# Patient Record
Sex: MALE | Race: WHITE | Employment: OTHER | URBAN - METROPOLITAN AREA
[De-identification: names, ages, dates, MRNs, and addresses within clinical notes are randomized per-mention and may not be internally consistent; named-entity substitution may affect disease eponyms.]

---

## 2022-05-31 ENCOUNTER — HOSPITAL ENCOUNTER (OUTPATIENT)
Dept: PREADMISSION TESTING | Age: 82
Discharge: HOME | End: 2022-05-31

## 2022-05-31 VITALS — HEIGHT: 69 IN | BODY MASS INDEX: 19.99 KG/M2 | WEIGHT: 135 LBS

## 2022-05-31 RX ORDER — SODIUM CHLORIDE 9 MG/ML
500 INJECTION, SOLUTION INTRAVENOUS CONTINUOUS
Status: CANCELLED | OUTPATIENT
Start: 2022-05-31

## 2022-05-31 NOTE — PERIOP NOTES
Patient advised to wear mask when entering any of the buildings. They will no longer need to be tested or quarantine prior to procedure. Patient does not meet criteria for special pop. No hx of sleep apnea or previous screening. Denies hx of MH. PCP is aware of surgery. CHG skin care kit given and process reviewed. Not participating in any research study or clinical trials. Patient instructed when coming in for surgery, do not use any lotions, creams or deodorant. Also to remove all jewelry. Instructed to wear something loose fitting and comfortable, easy to take off, easy to put on. Coming in 6-1 for labs/ekg.

## 2022-06-01 ENCOUNTER — HOSPITAL ENCOUNTER (OUTPATIENT)
Dept: PREADMISSION TESTING | Age: 82
Discharge: HOME | End: 2022-06-01
Payer: MEDICARE

## 2022-06-01 LAB
ANION GAP SERPL CALC-SCNC: 2 MMOL/L (ref 3–18)
ATRIAL RATE: 71 BPM
BUN SERPL-MCNC: 17 MG/DL (ref 7–18)
BUN/CREAT SERPL: 16 (ref 12–20)
CALCIUM SERPL-MCNC: 9 MG/DL (ref 8.5–10.1)
CALCULATED P AXIS, ECG09: 68 DEGREES
CALCULATED R AXIS, ECG10: 13 DEGREES
CALCULATED T AXIS, ECG11: 89 DEGREES
CHLORIDE SERPL-SCNC: 107 MMOL/L (ref 100–111)
CO2 SERPL-SCNC: 32 MMOL/L (ref 21–32)
CREAT SERPL-MCNC: 1.08 MG/DL (ref 0.6–1.3)
DIAGNOSIS, 93000: NORMAL
ERYTHROCYTE [DISTWIDTH] IN BLOOD BY AUTOMATED COUNT: 14.1 % (ref 11.6–14.5)
GLUCOSE SERPL-MCNC: 93 MG/DL (ref 74–99)
HCT VFR BLD AUTO: 47.4 % (ref 36–48)
HGB BLD-MCNC: 15.3 G/DL (ref 13–16)
INR PPP: 0.9 (ref 0.8–1.2)
MCH RBC QN AUTO: 29.9 PG (ref 24–34)
MCHC RBC AUTO-ENTMCNC: 32.3 G/DL (ref 31–37)
MCV RBC AUTO: 92.6 FL (ref 78–100)
NRBC # BLD: 0 K/UL (ref 0–0.01)
NRBC BLD-RTO: 0 PER 100 WBC
P-R INTERVAL, ECG05: 182 MS
PLATELET # BLD AUTO: 196 K/UL (ref 135–420)
PMV BLD AUTO: 9.2 FL (ref 9.2–11.8)
POTASSIUM SERPL-SCNC: 4 MMOL/L (ref 3.5–5.5)
PROTHROMBIN TIME: 12.9 SEC (ref 11.5–15.2)
Q-T INTERVAL, ECG07: 404 MS
QRS DURATION, ECG06: 112 MS
QTC CALCULATION (BEZET), ECG08: 439 MS
RBC # BLD AUTO: 5.12 M/UL (ref 4.35–5.65)
SODIUM SERPL-SCNC: 141 MMOL/L (ref 136–145)
VENTRICULAR RATE, ECG03: 71 BPM
WBC # BLD AUTO: 5.4 K/UL (ref 4.6–13.2)

## 2022-06-01 PROCEDURE — 85610 PROTHROMBIN TIME: CPT

## 2022-06-01 PROCEDURE — 85027 COMPLETE CBC AUTOMATED: CPT

## 2022-06-01 PROCEDURE — 80048 BASIC METABOLIC PNL TOTAL CA: CPT

## 2022-06-01 PROCEDURE — 36415 COLL VENOUS BLD VENIPUNCTURE: CPT

## 2022-06-01 PROCEDURE — 93005 ELECTROCARDIOGRAM TRACING: CPT

## 2022-06-09 ENCOUNTER — APPOINTMENT (OUTPATIENT)
Dept: GENERAL RADIOLOGY | Age: 82
End: 2022-06-09
Attending: INTERNAL MEDICINE
Payer: MEDICARE

## 2022-06-09 ENCOUNTER — HOSPITAL ENCOUNTER (OUTPATIENT)
Age: 82
Setting detail: OUTPATIENT SURGERY
Discharge: HOME | End: 2022-06-09
Attending: INTERNAL MEDICINE | Admitting: INTERNAL MEDICINE
Payer: MEDICARE

## 2022-06-09 ENCOUNTER — ANESTHESIA EVENT (OUTPATIENT)
Dept: ENDOSCOPY | Age: 82
End: 2022-06-09
Payer: MEDICARE

## 2022-06-09 ENCOUNTER — ANESTHESIA (OUTPATIENT)
Dept: ENDOSCOPY | Age: 82
End: 2022-06-09
Payer: MEDICARE

## 2022-06-09 VITALS
OXYGEN SATURATION: 97 % | SYSTOLIC BLOOD PRESSURE: 112 MMHG | HEIGHT: 67 IN | DIASTOLIC BLOOD PRESSURE: 54 MMHG | HEART RATE: 68 BPM | RESPIRATION RATE: 20 BRPM | BODY MASS INDEX: 20.89 KG/M2 | TEMPERATURE: 98.3 F | WEIGHT: 133.1 LBS

## 2022-06-09 PROBLEM — Z85.118 HISTORY OF LUNG CANCER: Status: ACTIVE | Noted: 2022-06-09

## 2022-06-09 PROBLEM — R59.1 LYMPHADENOPATHY: Status: ACTIVE | Noted: 2022-06-09

## 2022-06-09 PROBLEM — R91.1 LUNG NODULE: Status: ACTIVE | Noted: 2022-06-09

## 2022-06-09 LAB
ERYTHROCYTE [DISTWIDTH] IN BLOOD BY AUTOMATED COUNT: 14.2 % (ref 11.6–14.5)
HCT VFR BLD AUTO: 44.1 % (ref 36–48)
HGB BLD-MCNC: 14.3 G/DL (ref 13–16)
INR PPP: 1 (ref 0.8–1.2)
MCH RBC QN AUTO: 29.1 PG (ref 24–34)
MCHC RBC AUTO-ENTMCNC: 32.4 G/DL (ref 31–37)
MCV RBC AUTO: 89.6 FL (ref 78–100)
NRBC # BLD: 0 K/UL (ref 0–0.01)
NRBC BLD-RTO: 0 PER 100 WBC
PLATELET # BLD AUTO: 169 K/UL (ref 135–420)
PMV BLD AUTO: 9.5 FL (ref 9.2–11.8)
PROTHROMBIN TIME: 13.3 SEC (ref 11.5–15.2)
RBC # BLD AUTO: 4.92 M/UL (ref 4.35–5.65)
WBC # BLD AUTO: 5.7 K/UL (ref 4.6–13.2)

## 2022-06-09 PROCEDURE — 77030022556 HC FCPS BIOP TIS ENDOSC BSC -B: Performed by: INTERNAL MEDICINE

## 2022-06-09 PROCEDURE — 74011000250 HC RX REV CODE- 250: Performed by: NURSE ANESTHETIST, CERTIFIED REGISTERED

## 2022-06-09 PROCEDURE — 77030003400 HC NDL ASPIR BIOP CNMD -B: Performed by: INTERNAL MEDICINE

## 2022-06-09 PROCEDURE — 88112 CYTOPATH CELL ENHANCE TECH: CPT

## 2022-06-09 PROCEDURE — 74011250636 HC RX REV CODE- 250/636: Performed by: INTERNAL MEDICINE

## 2022-06-09 PROCEDURE — 85027 COMPLETE CBC AUTOMATED: CPT

## 2022-06-09 PROCEDURE — 85610 PROTHROMBIN TIME: CPT

## 2022-06-09 PROCEDURE — 88172 CYTP DX EVAL FNA 1ST EA SITE: CPT

## 2022-06-09 PROCEDURE — 77030018823 HC SLV COMPR VENO -B: Performed by: INTERNAL MEDICINE

## 2022-06-09 PROCEDURE — 77030003454 HC NDL BIOP BRONCH BSC -B: Performed by: INTERNAL MEDICINE

## 2022-06-09 PROCEDURE — 76040000008: Performed by: INTERNAL MEDICINE

## 2022-06-09 PROCEDURE — 76060000033 HC ANESTHESIA 1 TO 1.5 HR: Performed by: INTERNAL MEDICINE

## 2022-06-09 PROCEDURE — 88177 CYTP FNA EVAL EA ADDL: CPT

## 2022-06-09 PROCEDURE — 88305 TISSUE EXAM BY PATHOLOGIST: CPT

## 2022-06-09 PROCEDURE — 87205 SMEAR GRAM STAIN: CPT

## 2022-06-09 PROCEDURE — 74011000250 HC RX REV CODE- 250: Performed by: INTERNAL MEDICINE

## 2022-06-09 PROCEDURE — 77030022853 HC NDL ASPIR ULTRSND BSC -C: Performed by: INTERNAL MEDICINE

## 2022-06-09 PROCEDURE — 88173 CYTOPATH EVAL FNA REPORT: CPT

## 2022-06-09 PROCEDURE — 87102 FUNGUS ISOLATION CULTURE: CPT

## 2022-06-09 PROCEDURE — 77030012699 HC VLV SUC CNTRL OCOA -A: Performed by: INTERNAL MEDICINE

## 2022-06-09 PROCEDURE — 36415 COLL VENOUS BLD VENIPUNCTURE: CPT

## 2022-06-09 PROCEDURE — 77030040361 HC SLV COMPR DVT MDII -B: Performed by: INTERNAL MEDICINE

## 2022-06-09 PROCEDURE — 74011250636 HC RX REV CODE- 250/636: Performed by: NURSE ANESTHETIST, CERTIFIED REGISTERED

## 2022-06-09 PROCEDURE — 71045 X-RAY EXAM CHEST 1 VIEW: CPT

## 2022-06-09 PROCEDURE — 77030008683 HC TU ET CUF COVD -A: Performed by: SPECIALIST

## 2022-06-09 PROCEDURE — 77030008477 HC STYL SATN SLP COVD -A: Performed by: SPECIALIST

## 2022-06-09 PROCEDURE — 87206 SMEAR FLUORESCENT/ACID STAI: CPT

## 2022-06-09 PROCEDURE — 2709999900 HC NON-CHARGEABLE SUPPLY: Performed by: INTERNAL MEDICINE

## 2022-06-09 PROCEDURE — 77030006643: Performed by: SPECIALIST

## 2022-06-09 PROCEDURE — 87106 FUNGI IDENTIFICATION YEAST: CPT

## 2022-06-09 RX ORDER — FENTANYL CITRATE 50 UG/ML
INJECTION, SOLUTION INTRAMUSCULAR; INTRAVENOUS AS NEEDED
Status: DISCONTINUED | OUTPATIENT
Start: 2022-06-09 | End: 2022-06-09 | Stop reason: HOSPADM

## 2022-06-09 RX ORDER — ONDANSETRON 2 MG/ML
INJECTION INTRAMUSCULAR; INTRAVENOUS AS NEEDED
Status: DISCONTINUED | OUTPATIENT
Start: 2022-06-09 | End: 2022-06-09 | Stop reason: HOSPADM

## 2022-06-09 RX ORDER — NEOSTIGMINE METHYLSULFATE 1 MG/ML
INJECTION, SOLUTION INTRAVENOUS AS NEEDED
Status: DISCONTINUED | OUTPATIENT
Start: 2022-06-09 | End: 2022-06-09 | Stop reason: HOSPADM

## 2022-06-09 RX ORDER — ROCURONIUM BROMIDE 10 MG/ML
INJECTION, SOLUTION INTRAVENOUS AS NEEDED
Status: DISCONTINUED | OUTPATIENT
Start: 2022-06-09 | End: 2022-06-09 | Stop reason: HOSPADM

## 2022-06-09 RX ORDER — SODIUM CHLORIDE 0.9 % (FLUSH) 0.9 %
5-40 SYRINGE (ML) INJECTION EVERY 8 HOURS
Status: CANCELLED | OUTPATIENT
Start: 2022-06-09

## 2022-06-09 RX ORDER — SODIUM CHLORIDE, SODIUM LACTATE, POTASSIUM CHLORIDE, CALCIUM CHLORIDE 600; 310; 30; 20 MG/100ML; MG/100ML; MG/100ML; MG/100ML
50 INJECTION, SOLUTION INTRAVENOUS CONTINUOUS
Status: CANCELLED | OUTPATIENT
Start: 2022-06-09

## 2022-06-09 RX ORDER — LIDOCAINE HYDROCHLORIDE 20 MG/ML
5 SOLUTION OROPHARYNGEAL ONCE
Status: CANCELLED | OUTPATIENT
Start: 2022-06-09 | End: 2022-06-09

## 2022-06-09 RX ORDER — SUCCINYLCHOLINE CHLORIDE 100 MG/5ML
SYRINGE (ML) INTRAVENOUS AS NEEDED
Status: DISCONTINUED | OUTPATIENT
Start: 2022-06-09 | End: 2022-06-09 | Stop reason: HOSPADM

## 2022-06-09 RX ORDER — HYDROMORPHONE HYDROCHLORIDE 2 MG/ML
0.5 INJECTION, SOLUTION INTRAMUSCULAR; INTRAVENOUS; SUBCUTANEOUS
Status: CANCELLED | OUTPATIENT
Start: 2022-06-09

## 2022-06-09 RX ORDER — ONDANSETRON 2 MG/ML
4 INJECTION INTRAMUSCULAR; INTRAVENOUS ONCE
Status: CANCELLED | OUTPATIENT
Start: 2022-06-09 | End: 2022-06-09

## 2022-06-09 RX ORDER — SODIUM CHLORIDE 9 MG/ML
500 INJECTION, SOLUTION INTRAVENOUS CONTINUOUS
Status: DISCONTINUED | OUTPATIENT
Start: 2022-06-09 | End: 2022-06-09 | Stop reason: HOSPADM

## 2022-06-09 RX ORDER — FENTANYL CITRATE 50 UG/ML
25 INJECTION, SOLUTION INTRAMUSCULAR; INTRAVENOUS
Status: CANCELLED | OUTPATIENT
Start: 2022-06-09

## 2022-06-09 RX ORDER — ARFORMOTEROL TARTRATE 15 UG/2ML
15 SOLUTION RESPIRATORY (INHALATION) ONCE
Status: COMPLETED | OUTPATIENT
Start: 2022-06-09 | End: 2022-06-09

## 2022-06-09 RX ORDER — PROPOFOL 10 MG/ML
INJECTION, EMULSION INTRAVENOUS AS NEEDED
Status: DISCONTINUED | OUTPATIENT
Start: 2022-06-09 | End: 2022-06-09 | Stop reason: HOSPADM

## 2022-06-09 RX ORDER — BUDESONIDE 0.25 MG/2ML
500 INHALANT ORAL ONCE
Status: COMPLETED | OUTPATIENT
Start: 2022-06-09 | End: 2022-06-09

## 2022-06-09 RX ORDER — DEXAMETHASONE SODIUM PHOSPHATE 4 MG/ML
INJECTION, SOLUTION INTRA-ARTICULAR; INTRALESIONAL; INTRAMUSCULAR; INTRAVENOUS; SOFT TISSUE AS NEEDED
Status: DISCONTINUED | OUTPATIENT
Start: 2022-06-09 | End: 2022-06-09 | Stop reason: HOSPADM

## 2022-06-09 RX ORDER — DEXMEDETOMIDINE HYDROCHLORIDE 100 UG/ML
INJECTION, SOLUTION INTRAVENOUS AS NEEDED
Status: DISCONTINUED | OUTPATIENT
Start: 2022-06-09 | End: 2022-06-09 | Stop reason: HOSPADM

## 2022-06-09 RX ORDER — LIDOCAINE HYDROCHLORIDE 20 MG/ML
JELLY TOPICAL ONCE
Status: CANCELLED | OUTPATIENT
Start: 2022-06-09 | End: 2022-06-09

## 2022-06-09 RX ORDER — NALOXONE HYDROCHLORIDE 0.4 MG/ML
0.1 INJECTION, SOLUTION INTRAMUSCULAR; INTRAVENOUS; SUBCUTANEOUS
Status: CANCELLED | OUTPATIENT
Start: 2022-06-09

## 2022-06-09 RX ORDER — SODIUM CHLORIDE 0.9 % (FLUSH) 0.9 %
5-40 SYRINGE (ML) INJECTION AS NEEDED
Status: CANCELLED | OUTPATIENT
Start: 2022-06-09

## 2022-06-09 RX ORDER — OXYCODONE AND ACETAMINOPHEN 5; 325 MG/1; MG/1
1 TABLET ORAL AS NEEDED
Status: CANCELLED | OUTPATIENT
Start: 2022-06-09

## 2022-06-09 RX ORDER — GLYCOPYRROLATE 0.2 MG/ML
INJECTION INTRAMUSCULAR; INTRAVENOUS AS NEEDED
Status: DISCONTINUED | OUTPATIENT
Start: 2022-06-09 | End: 2022-06-09 | Stop reason: HOSPADM

## 2022-06-09 RX ORDER — EPHEDRINE SULFATE/0.9% NACL/PF 50 MG/5 ML
SYRINGE (ML) INTRAVENOUS AS NEEDED
Status: DISCONTINUED | OUTPATIENT
Start: 2022-06-09 | End: 2022-06-09 | Stop reason: HOSPADM

## 2022-06-09 RX ORDER — SODIUM CHLORIDE 9 MG/ML
25 INJECTION, SOLUTION INTRAVENOUS CONTINUOUS
Status: CANCELLED | OUTPATIENT
Start: 2022-06-09

## 2022-06-09 RX ORDER — LIDOCAINE HYDROCHLORIDE 20 MG/ML
INJECTION, SOLUTION EPIDURAL; INFILTRATION; INTRACAUDAL; PERINEURAL AS NEEDED
Status: DISCONTINUED | OUTPATIENT
Start: 2022-06-09 | End: 2022-06-09 | Stop reason: HOSPADM

## 2022-06-09 RX ADMIN — DEXMEDETOMIDINE HYDROCHLORIDE 6 MCG: 100 INJECTION, SOLUTION INTRAVENOUS at 14:56

## 2022-06-09 RX ADMIN — Medication 10 MG: at 14:48

## 2022-06-09 RX ADMIN — BUDESONIDE 500 MCG: 0.25 INHALANT RESPIRATORY (INHALATION) at 15:30

## 2022-06-09 RX ADMIN — ROCURONIUM BROMIDE 15 MG: 10 INJECTION, SOLUTION INTRAVENOUS at 14:21

## 2022-06-09 RX ADMIN — DEXMEDETOMIDINE HYDROCHLORIDE 2 MCG: 100 INJECTION, SOLUTION INTRAVENOUS at 14:14

## 2022-06-09 RX ADMIN — ROCURONIUM BROMIDE 10 MG: 10 INJECTION, SOLUTION INTRAVENOUS at 14:04

## 2022-06-09 RX ADMIN — GLYCOPYRROLATE 0.3 MG: 0.2 INJECTION INTRAMUSCULAR; INTRAVENOUS at 14:51

## 2022-06-09 RX ADMIN — Medication 140 MG: at 14:04

## 2022-06-09 RX ADMIN — PROPOFOL 150 MG: 10 INJECTION, EMULSION INTRAVENOUS at 14:04

## 2022-06-09 RX ADMIN — LIDOCAINE HYDROCHLORIDE 80 MG: 20 INJECTION, SOLUTION EPIDURAL; INFILTRATION; INTRACAUDAL; PERINEURAL at 14:03

## 2022-06-09 RX ADMIN — ARFORMOTEROL TARTRATE 15 MCG: 15 SOLUTION RESPIRATORY (INHALATION) at 15:31

## 2022-06-09 RX ADMIN — SODIUM CHLORIDE: 900 INJECTION, SOLUTION INTRAVENOUS at 14:21

## 2022-06-09 RX ADMIN — DEXAMETHASONE SODIUM PHOSPHATE 2 MG: 4 INJECTION, SOLUTION INTRAMUSCULAR; INTRAVENOUS at 14:49

## 2022-06-09 RX ADMIN — Medication 1.8 MG: at 14:51

## 2022-06-09 RX ADMIN — DEXAMETHASONE SODIUM PHOSPHATE 8 MG: 4 INJECTION, SOLUTION INTRAMUSCULAR; INTRAVENOUS at 14:12

## 2022-06-09 RX ADMIN — ONDANSETRON HYDROCHLORIDE 4 MG: 2 INJECTION INTRAMUSCULAR; INTRAVENOUS at 14:48

## 2022-06-09 RX ADMIN — FENTANYL CITRATE 100 MCG: 50 INJECTION, SOLUTION INTRAMUSCULAR; INTRAVENOUS at 14:00

## 2022-06-09 RX ADMIN — DEXMEDETOMIDINE HYDROCHLORIDE 4 MCG: 100 INJECTION, SOLUTION INTRAVENOUS at 14:16

## 2022-06-09 NOTE — ANESTHESIA POSTPROCEDURE EVALUATION
Post-Anesthesia Evaluation and Assessment    Cardiovascular Function/Vital Signs  Visit Vitals  /72   Pulse 70   Temp 36.8 °C (98.3 °F)   Resp 19   Ht 5' 7\" (1.702 m)   Wt 60.4 kg (133 lb 1.6 oz)   SpO2 99%   BMI 20.85 kg/m²       Patient is status post Procedure(s):  ENDOSCOPIC BRONCHOSCOPY ULTRASOUND WITH C ARM  BRONCHOSCOPY. Nausea/Vomiting: Controlled. Postoperative hydration reviewed and adequate. Pain:  Pain Scale 1: Numeric (0 - 10) (06/09/22 1515)  Pain Intensity 1: 0 (06/09/22 1515)   Managed. Neurological Status: At baseline. Mental Status and Level of Consciousness: Arousable. Pulmonary Status:   O2 Device: Nasal cannula (06/09/22 1515)   Adequate oxygenation and airway patent. Complications related to anesthesia: None    Post-anesthesia assessment completed. No concerns. Patient has met all discharge requirements.     Signed By: Darrick Grissom CRNA    June 9, 2022

## 2022-06-09 NOTE — DISCHARGE INSTRUCTIONS
DISCHARGE SUMMARY from Nurse    PATIENT INSTRUCTIONS:    After general anesthesia or intravenous sedation, for 24 hours or while taking prescription Narcotics:  · Limit your activities  · Do not drive and operate hazardous machinery  · Do not make important personal or business decisions  · Do  not drink alcoholic beverages  · If you have not urinated within 8 hours after discharge, please contact your surgeon on call. Report the following to your surgeon:  · Excessive pain, swelling, redness or odor of or around the surgical area  · Temperature over 100.5  · Nausea and vomiting lasting longer than 4 hours or if unable to take medications  · Any signs of decreased circulation or nerve impairment to extremity: change in color, persistent  numbness, tingling, coldness or increase pain  · Any questions    What to do at Home:  Recommended activity: as listed as above, NO DRIVING/DRINKING ALCOHOL/ SEDATIVE DRUGS/ LEGAL DECISIONS    If you experience any of the following symptoms AS LISTED ABOVE, please follow up with DR. WALKER/[facility] PULMONOLOGY, OR NEAREST EMERGENCY DEPARTMENT/OR DIAL 911    *  Please give a list of your current medications to your Primary Care Provider. *  Please update this list whenever your medications are discontinued, doses are      changed, or new medications (including over-the-counter products) are added. *  Please carry medication information at all times in case of emergency situations. These are general instructions for a healthy lifestyle:    No smoking/ No tobacco products/ Avoid exposure to second hand smoke  Surgeon General's Warning:  Quitting smoking now greatly reduces serious risk to your health.     Obesity, smoking, and sedentary lifestyle greatly increases your risk for illness    A healthy diet, regular physical exercise & weight monitoring are important for maintaining a healthy lifestyle    You may be retaining fluid if you have a history of heart failure or if you experience any of the following symptoms:  Weight gain of 3 pounds or more overnight or 5 pounds in a week, increased swelling in our hands or feet or shortness of breath while lying flat in bed. Please call your doctor as soon as you notice any of these symptoms; do not wait until your next office visit. The discharge information has been reviewed with the patient and spouse. The patient and spouse verbalized understanding. Discharge medications reviewed with the patient and spouse and appropriate educational materials and side effects teaching were provided.   Patient armband removed and shredded  ___________________________________________________________________________________________________________________________________

## 2022-06-09 NOTE — H&P
[de-identified]years old  male with past medical history of COPD current smoker history of lung cancer. Status post right upper lobe resection. New lung nodule. Left lymph node enlarged station 4R and station 7 mediastinal lymph node enlarged. Abnormal on the PET scan. Suspect recurrent of the disease. Daily cough occasional wheezing. risks and benefits of procedure discussed. Oncologist sent him for repeat EBUS.   Danielle Acevedo MD

## 2022-06-09 NOTE — PROGRESS NOTES
[facility]                  Pulmonary, Critical Care, and Sleep Medicine     [facility] at [facility] \[facility]\""   [facility], [address] [place] [address], [address] [place] [address], [place], [address]   Phone: [phone]. Fax: [phone] Phone: [phone]. Fax: [phone]     Name: Alba Bah MRN: [id number]   : 1940 [facility]: [facility] [place]   Date: 2022        BRONCHOSCOPY DISCHARGE INSTRUCTIONS    After bronchoscopy, cough, fever and respiratory secretions are expected for 6 to 24 hours. Please use simple analgesics such as Tylenol if needed. If biopsies were done, some sputum mixed with blood clots will be seen. If you are coughing amos blood or having chest pain or shortness of breath - please call 911. For other non-emergent issues, you can call our office at [phone] or [phone]. It is recommended to take rest the day of the procedure after going home. Since sedation was used, you would feel sleepy. It is recommended not to drive or operate machinery the day of the procedure. Light meal advised for the very first meal post-procedure. Normal meal can be taken thereafter. If the procedure was done with breathing tube and general anesthesia, some sore throat is expected for 24 to 48 hours. DISCOMFORT:  Sore throat- throat lozenges or warm salt water gargle  Redness at IV site- apply warm compress to area; if redness or soreness persist- contact your physician  Should not operate a vehicle for at least 12 hours  You should not engage in an occupation involving machinery or appliances for rest of today  You should not drink alcoholic beverages for at least 12 hours  Avoid making any critical decisions for at least 24 hour  Blood tinged secretions - this should stop within 24 hours    DIET:  Nothing by mouth- do not eat or drink for two hours.  You may eat and drink after 2 hras    You may resume your regular diet - however -  remember your colon is empty and a heavy meal will produce gas. Avoid these foods:  vegetables, fried / greasy foods, carbonated drinks    MEDICATIONS:  Current Discharge Medication List          ACTIVITY:  You may resume your normal daily activities however it is recommended that you spend the remainder of the day resting -  avoid any strenuous activity. CALL MD/911/EMS FOR ANY SIGN OF:   Increasing pain, nausea, vomiting. New or increased bleeding. Coughing up more than ½ cup of blood. (call 911)   Bloody discharge from nose or mouth. Fever (chills). Pain in chest area. Shortness of breath. (suddent onset of shortness of breath with sharp chest pain, then call 911)  Bubbles under the skin around the collarbone or neck. These may crackle and pop when you press on them. (call 911)     Call [phone] office for the following  Results of procedure / biopsy in 7-10 days  Appointment in 7-10 days  Telephone #  [phone] or [phone]  Additional instructions: If you have not heard the results of your test by 7-10 days, please call the phone number listed above.

## 2022-06-09 NOTE — PERIOP NOTES
Awaiting lab to collect specimens requested for procedure and anesthesia to arrive for assessment and consent

## 2022-06-09 NOTE — PROCEDURES
[facility]                  Pulmonary, Critical Care, and Sleep Medicine     Bronchoscopy with Endobronchial Ultrasound-Guided Transbronchial Needle Aspiration  Station 7 and  4L andb BAL      Pre-procedure diagnosis  · Mediatsinal lymphadenopathy. · Hilar lymphadenopathy. PROBLEM LIST  Hospital Problems  Date Reviewed: 6/9/2022          Codes Class Noted POA    Lymphadenopathy ICD-10-CM: R59.1  ICD-9-CM: 785.6  6/9/2022 Yes        Lung nodule ICD-10-CM: R91.1  ICD-9-CM: 793.11  6/9/2022 Yes        History of lung cancer ICD-10-CM: S92.823  ICD-9-CM: V10.11  6/9/2022 Yes                Post procedure diagnosis  · Same    Procedures  · EBUS Bronchoscopy . · Flexible Fiberoptic Diagnostic Bronchoscopy. · EBUS guided TBNA of lymph node station 7  · EBUS guided TBNA of lymph node station 4L  ·     · BAL from RUL lobe. ·     Consent/Treatment: Informed consent was obtained from the  patient after risks, benefits and alternatives were explained. Timeout verified the correct patient and correct procedure. Anesthesia:   General anesthesia was performed by anesthesiology,  8.0 mm ETT was placed. Airway adaptor and mouth guard placed. Procedure Details:   Procedure #1 EBUS:      ETT  Once vital signs were verified, the endobronchial ultrasound bronchoscope was inserted through the ETT without difficulty into mid trachea. Total of 10  ml of 1% Lidocaine was administered to anesthetize bilateral tracheobronchial tree.      Mediastinal and bilateral hilar inspection done using EBUS and color Doppler:  EBUS guided TBNA done using 22 G needle after confirming the station location and verification by color Doppler:     Station Size (cm) Order of TBNA TBNA (passes)   2R      2L      4R (right paratracheal) 0.4     4l (left paratracheal) 1 cm   1   7 (subcarinal) 1 cm 1 4   10R (right hilar)  0.5     10L (left hilar)      11R (right infrahilar)      11L (left infrahilar) All TBNA sent for slides and cell block. Procedure #2 Flexible Fiberoptic Bronchoscopy: The endobronchial ultrasound bronchoscope was then withdrawn with intact balloon and the regular flexible fiberoptic bronchoscope was inserted through the ETT, and the following airway surveillance done. Airways surveillance/specimens and further procedure:     Observation Washing for Cytology BAL for Cytology and Microbiology Endobronchial Biopsy for Pathology   Vocal Cord Not seen due to ETT. Trachea No mass/nodule/compression   No but changed after resection    RUL No mass/nodule/compression      RML No mass/nodule/compression      RLL No mass/nodule/compression  Yellow secretions     KRYSTINA No mass/nodule/compression      Lingula No mass/nodule/compression      LLL No mass/nodule/compression      TBNA Site No active bleeding               The bronchoscope was then removed and the procedure completed. Rapid On-Site Evaluation:   A preliminary diagnosis of maliganat cell and final diagnosis is pending. Preliminary report from slides showed lymphocytes and malignant cells in both station 7 and station 4L   and final report pending. Complications: none. Balloon was intact on EBUS. Vital signs remained stable throughout the procedure. Patient was woken up in endoscopy suite and then transferred to recovery area in a stable condition. Family members were updated by me. Estimated Blood Loss: < 5 cc. PLAN:  · Transferred to PACU in stable condition. · CXR stat. · NPO till 2  · Await path results. · Discharge home when discharge criteria met. · Follow up with Pulmonary clinic in 7-10 days. · D/w family in detail, photos shown.         Mana Delarosa MD  6/9/2022 3:21 PM

## 2022-06-12 LAB
BACTERIA SPEC CULT: NORMAL
GRAM STN SPEC: NORMAL
SERVICE CMNT-IMP: NORMAL

## 2022-06-22 LAB
BACTERIA SPEC CULT: ABNORMAL
BACTERIA SPEC CULT: ABNORMAL
SERVICE CMNT-IMP: ABNORMAL

## 2022-07-26 LAB
ACID FAST STN SPEC: NEGATIVE
MYCOBACTERIUM SPEC QL CULT: NEGATIVE
SPECIMEN PREPARATION: NORMAL

## 2023-02-16 ENCOUNTER — APPOINTMENT (OUTPATIENT)
Facility: HOSPITAL | Age: 83
DRG: 916 | End: 2023-02-16
Payer: MEDICARE

## 2023-02-16 ENCOUNTER — HOSPITAL ENCOUNTER (INPATIENT)
Facility: HOSPITAL | Age: 83
LOS: 1 days | Discharge: HOME | DRG: 916 | End: 2023-02-17
Attending: EMERGENCY MEDICINE | Admitting: INTERNAL MEDICINE
Payer: MEDICARE

## 2023-02-16 DIAGNOSIS — T78.3XXA ANGIOEDEMA, INITIAL ENCOUNTER: Primary | ICD-10-CM

## 2023-02-16 PROBLEM — C34.11 MALIGNANT NEOPLASM OF UPPER LOBE OF RIGHT LUNG (HCC): Status: ACTIVE | Noted: 2019-07-08

## 2023-02-16 PROBLEM — F17.200 TOBACCO DEPENDENCE SYNDROME: Status: ACTIVE | Noted: 2023-02-16

## 2023-02-16 PROBLEM — R63.4 WEIGHT LOSS: Status: ACTIVE | Noted: 2023-02-16

## 2023-02-16 PROBLEM — C34.90 ADENOCARCINOMA OF LUNG (HCC): Status: ACTIVE | Noted: 2023-02-16

## 2023-02-16 PROBLEM — G93.40 ENCEPHALOPATHY: Status: RESOLVED | Noted: 2023-02-16 | Resolved: 2023-02-16

## 2023-02-16 PROBLEM — R59.1 LYMPHADENOPATHY: Status: ACTIVE | Noted: 2022-06-09

## 2023-02-16 PROBLEM — G93.40 ENCEPHALOPATHY: Status: ACTIVE | Noted: 2023-02-16

## 2023-02-16 PROCEDURE — 92526 ORAL FUNCTION THERAPY: CPT

## 2023-02-16 PROCEDURE — 2580000003 HC RX 258: Performed by: EMERGENCY MEDICINE

## 2023-02-16 PROCEDURE — 2580000003 HC RX 258: Performed by: INTERNAL MEDICINE

## 2023-02-16 PROCEDURE — 6370000000 HC RX 637 (ALT 250 FOR IP): Performed by: INTERNAL MEDICINE

## 2023-02-16 PROCEDURE — 96376 TX/PRO/DX INJ SAME DRUG ADON: CPT | Performed by: EMERGENCY MEDICINE

## 2023-02-16 PROCEDURE — 99285 EMERGENCY DEPT VISIT HI MDM: CPT | Performed by: EMERGENCY MEDICINE

## 2023-02-16 PROCEDURE — 6360000002 HC RX W HCPCS: Performed by: INTERNAL MEDICINE

## 2023-02-16 PROCEDURE — 2000000000 HC ICU R&B

## 2023-02-16 PROCEDURE — 96375 TX/PRO/DX INJ NEW DRUG ADDON: CPT | Performed by: EMERGENCY MEDICINE

## 2023-02-16 PROCEDURE — 86160 COMPLEMENT ANTIGEN: CPT

## 2023-02-16 PROCEDURE — 96372 THER/PROPH/DIAG INJ SC/IM: CPT | Performed by: EMERGENCY MEDICINE

## 2023-02-16 PROCEDURE — 6360000002 HC RX W HCPCS: Performed by: EMERGENCY MEDICINE

## 2023-02-16 PROCEDURE — 94640 AIRWAY INHALATION TREATMENT: CPT

## 2023-02-16 PROCEDURE — A4216 STERILE WATER/SALINE, 10 ML: HCPCS | Performed by: EMERGENCY MEDICINE

## 2023-02-16 PROCEDURE — 96374 THER/PROPH/DIAG INJ IV PUSH: CPT | Performed by: EMERGENCY MEDICINE

## 2023-02-16 PROCEDURE — 71045 X-RAY EXAM CHEST 1 VIEW: CPT

## 2023-02-16 PROCEDURE — 2500000003 HC RX 250 WO HCPCS: Performed by: EMERGENCY MEDICINE

## 2023-02-16 PROCEDURE — 92610 EVALUATE SWALLOWING FUNCTION: CPT

## 2023-02-16 RX ORDER — IPRATROPIUM BROMIDE AND ALBUTEROL SULFATE 2.5; .5 MG/3ML; MG/3ML
1 SOLUTION RESPIRATORY (INHALATION)
Status: DISCONTINUED | OUTPATIENT
Start: 2023-02-16 | End: 2023-02-17 | Stop reason: HOSPADM

## 2023-02-16 RX ORDER — SODIUM CHLORIDE 9 MG/ML
INJECTION, SOLUTION INTRAVENOUS CONTINUOUS
Status: DISCONTINUED | OUTPATIENT
Start: 2023-02-16 | End: 2023-02-17 | Stop reason: HOSPADM

## 2023-02-16 RX ORDER — DIPHENHYDRAMINE HYDROCHLORIDE 50 MG/ML
25 INJECTION INTRAMUSCULAR; INTRAVENOUS
Status: COMPLETED | OUTPATIENT
Start: 2023-02-16 | End: 2023-02-16

## 2023-02-16 RX ORDER — CLOBETASOL PROPIONATE 0.5 MG/G
OINTMENT TOPICAL
COMMUNITY
Start: 2023-02-04

## 2023-02-16 RX ORDER — METHYLPREDNISOLONE SODIUM SUCCINATE 125 MG/2ML
125 INJECTION, POWDER, LYOPHILIZED, FOR SOLUTION INTRAMUSCULAR; INTRAVENOUS
Status: COMPLETED | OUTPATIENT
Start: 2023-02-16 | End: 2023-02-16

## 2023-02-16 RX ORDER — FAMOTIDINE 20 MG/1
20 TABLET, FILM COATED ORAL 2 TIMES DAILY
Status: DISCONTINUED | OUTPATIENT
Start: 2023-02-16 | End: 2023-02-17 | Stop reason: HOSPADM

## 2023-02-16 RX ORDER — SODIUM CHLORIDE 9 MG/ML
INJECTION, SOLUTION INTRAVENOUS PRN
Status: DISCONTINUED | OUTPATIENT
Start: 2023-02-16 | End: 2023-02-17 | Stop reason: HOSPADM

## 2023-02-16 RX ORDER — ENOXAPARIN SODIUM 100 MG/ML
40 INJECTION SUBCUTANEOUS EVERY 24 HOURS
Status: DISCONTINUED | OUTPATIENT
Start: 2023-02-16 | End: 2023-02-17 | Stop reason: HOSPADM

## 2023-02-16 RX ORDER — SODIUM CHLORIDE 0.9 % (FLUSH) 0.9 %
5-40 SYRINGE (ML) INJECTION EVERY 12 HOURS SCHEDULED
Status: DISCONTINUED | OUTPATIENT
Start: 2023-02-16 | End: 2023-02-17 | Stop reason: HOSPADM

## 2023-02-16 RX ORDER — TIOTROPIUM BROMIDE AND OLODATEROL 3.124; 2.736 UG/1; UG/1
SPRAY, METERED RESPIRATORY (INHALATION)
COMMUNITY
Start: 2023-01-06

## 2023-02-16 RX ORDER — EPINEPHRINE 1 MG/ML
0.3 INJECTION, SOLUTION, CONCENTRATE INTRAVENOUS ONCE
Status: COMPLETED | OUTPATIENT
Start: 2023-02-16 | End: 2023-02-16

## 2023-02-16 RX ORDER — DIPHENHYDRAMINE HYDROCHLORIDE 50 MG/ML
25 INJECTION INTRAMUSCULAR; INTRAVENOUS EVERY 12 HOURS
Status: DISCONTINUED | OUTPATIENT
Start: 2023-02-16 | End: 2023-02-17 | Stop reason: HOSPADM

## 2023-02-16 RX ORDER — ACETAMINOPHEN 325 MG/1
650 TABLET ORAL EVERY 6 HOURS PRN
Status: DISCONTINUED | OUTPATIENT
Start: 2023-02-16 | End: 2023-02-17 | Stop reason: HOSPADM

## 2023-02-16 RX ORDER — ONDANSETRON 2 MG/ML
4 INJECTION INTRAMUSCULAR; INTRAVENOUS EVERY 6 HOURS PRN
Status: DISCONTINUED | OUTPATIENT
Start: 2023-02-16 | End: 2023-02-17 | Stop reason: HOSPADM

## 2023-02-16 RX ORDER — METHYLPREDNISOLONE SODIUM SUCCINATE 40 MG/ML
40 INJECTION, POWDER, LYOPHILIZED, FOR SOLUTION INTRAMUSCULAR; INTRAVENOUS EVERY 6 HOURS
Status: DISCONTINUED | OUTPATIENT
Start: 2023-02-16 | End: 2023-02-17 | Stop reason: HOSPADM

## 2023-02-16 RX ORDER — ONDANSETRON 4 MG/1
4 TABLET, ORALLY DISINTEGRATING ORAL EVERY 8 HOURS PRN
Status: DISCONTINUED | OUTPATIENT
Start: 2023-02-16 | End: 2023-02-17 | Stop reason: HOSPADM

## 2023-02-16 RX ORDER — POLYETHYLENE GLYCOL 3350 17 G/17G
17 POWDER, FOR SOLUTION ORAL DAILY PRN
Status: DISCONTINUED | OUTPATIENT
Start: 2023-02-16 | End: 2023-02-17 | Stop reason: HOSPADM

## 2023-02-16 RX ORDER — SODIUM CHLORIDE 0.9 % (FLUSH) 0.9 %
5-40 SYRINGE (ML) INJECTION PRN
Status: DISCONTINUED | OUTPATIENT
Start: 2023-02-16 | End: 2023-02-17 | Stop reason: HOSPADM

## 2023-02-16 RX ORDER — ACETAMINOPHEN 650 MG/1
650 SUPPOSITORY RECTAL EVERY 6 HOURS PRN
Status: DISCONTINUED | OUTPATIENT
Start: 2023-02-16 | End: 2023-02-17 | Stop reason: HOSPADM

## 2023-02-16 RX ORDER — DEXAMETHASONE SODIUM PHOSPHATE 4 MG/ML
4 INJECTION, SOLUTION INTRA-ARTICULAR; INTRALESIONAL; INTRAMUSCULAR; INTRAVENOUS; SOFT TISSUE
Status: COMPLETED | OUTPATIENT
Start: 2023-02-16 | End: 2023-02-16

## 2023-02-16 RX ADMIN — SODIUM CHLORIDE, PRESERVATIVE FREE 10 ML: 5 INJECTION INTRAVENOUS at 23:36

## 2023-02-16 RX ADMIN — SODIUM CHLORIDE: 9 INJECTION, SOLUTION INTRAVENOUS at 13:21

## 2023-02-16 RX ADMIN — FAMOTIDINE 20 MG: 10 INJECTION, SOLUTION INTRAVENOUS at 06:27

## 2023-02-16 RX ADMIN — SODIUM CHLORIDE: 9 INJECTION, SOLUTION INTRAVENOUS at 23:36

## 2023-02-16 RX ADMIN — DIPHENHYDRAMINE HYDROCHLORIDE 25 MG: 50 INJECTION, SOLUTION INTRAMUSCULAR; INTRAVENOUS at 06:23

## 2023-02-16 RX ADMIN — IPRATROPIUM BROMIDE AND ALBUTEROL SULFATE 1 AMPULE: .5; 2.5 SOLUTION RESPIRATORY (INHALATION) at 20:24

## 2023-02-16 RX ADMIN — EPINEPHRINE 0.3 MG: 1 INJECTION, SOLUTION, CONCENTRATE INTRAVENOUS at 06:23

## 2023-02-16 RX ADMIN — METHYLPREDNISOLONE SODIUM SUCCINATE 40 MG: 40 INJECTION, POWDER, FOR SOLUTION INTRAMUSCULAR; INTRAVENOUS at 13:21

## 2023-02-16 RX ADMIN — METHYLPREDNISOLONE SODIUM SUCCINATE 125 MG: 125 INJECTION, POWDER, FOR SOLUTION INTRAMUSCULAR; INTRAVENOUS at 06:22

## 2023-02-16 RX ADMIN — METHYLPREDNISOLONE SODIUM SUCCINATE 40 MG: 40 INJECTION, POWDER, FOR SOLUTION INTRAMUSCULAR; INTRAVENOUS at 19:08

## 2023-02-16 RX ADMIN — FAMOTIDINE 20 MG: 20 TABLET, FILM COATED ORAL at 17:31

## 2023-02-16 RX ADMIN — ENOXAPARIN SODIUM 40 MG: 100 INJECTION SUBCUTANEOUS at 17:33

## 2023-02-16 RX ADMIN — DIPHENHYDRAMINE HYDROCHLORIDE 25 MG: 50 INJECTION, SOLUTION INTRAMUSCULAR; INTRAVENOUS at 08:28

## 2023-02-16 RX ADMIN — DIPHENHYDRAMINE HYDROCHLORIDE 25 MG: 50 INJECTION, SOLUTION INTRAMUSCULAR; INTRAVENOUS at 17:31

## 2023-02-16 RX ADMIN — DEXAMETHASONE SODIUM PHOSPHATE 4 MG: 4 INJECTION, SOLUTION INTRAMUSCULAR; INTRAVENOUS at 08:28

## 2023-02-16 ASSESSMENT — PAIN - FUNCTIONAL ASSESSMENT: PAIN_FUNCTIONAL_ASSESSMENT: NONE - DENIES PAIN

## 2023-02-16 ASSESSMENT — PAIN SCALES - GENERAL
PAINLEVEL_OUTOF10: 0
PAINLEVEL_OUTOF10: 0

## 2023-02-16 NOTE — PROGRESS NOTES
Patient will:  1. Tolerate PO trials with 0 s/s overt distress in 4/5 trials. 2. Utilize compensatory swallow strategies/maneuvers (decrease bite/sip, size/rate, alt. liq/sol) with min cues in 4/5 trials. 3. Perform oral-motor/laryngeal exercises to increase oropharyngeal swallow function with min cues. 4. Complete an objective swallow study (i.e., MBSS) to assess swallow integrity, r/o aspiration, and determine of safest LRD, min A.    Rec:     Full Liquid (per MD); once cleared by MD, upgrade to puree, thin liquid   -3-second bolus hold; effortful swallow; double swallow  Aspiration precautions  HOB >45 during po intake, remain >30 for 30-45 minutes after po   Small bites/sips; alternate liquid/solid with slow feeding rate   Oral care TID  Meds crushed in puree     SPEECH [address] EVALUATION/TREATMENT    Patient: Tyler Dougherty (28 y.o. male)  Date: 2/16/2023  Primary Diagnosis: Encephalopathy [G93.40]  Angioedema of tongue [T78. 3XXA]       Precautions: Aspiration  PLOF: As per H&P    ASSESSMENT:  Based on the objective data described below, the patient presents with mild oropharyngeal dysphagia in setting of oral/tongue swelling. Patient reports tongue swelling has gone down since this morning but still visualized by SLP. Patient agitated upon entry and initially refused water (\"I am not drinking that\"); however, agreeable once swallow study explained. Patient's son/spouse present in the room. OM examination significant for reduced lingual rate/ROM (likely 2/swelling). Upper/lower dentures. Trials of thin and puree given. Suspect reduced bolus control and delayed/uncoordinated AP transit. Patient reporting sensation of residue in pharynx following bite of puree. Patient cued to swallow x2 - 3 and given liquid wash. Effective in cleaning. Laryngeal elevation noted to palpation and appears functional. Swallow initiation appears timely.  Multiple swallows to clear bolus suggests some pharyngeal weakness. Patient put on full liquid diet by physician. Once cleared by physician, rec puree/liquid. Patient would benefit from cues to take small, single bites/sips, swallow 2 - 3 times per bolus, use liquid wash if needed, and remain upright during/after meals. Precautions reviewed with patient/ family; all verbalized understanding. ST liya LANCE/melo RN and MD paged x2. TREATMENT:  Skilled therapy initiated; Educated patient on aspiration precautions and importance of compensatory swallow techniques to decrease aspiration risk (decrease rate of intake & sip/bite size, upright @HOB for all po intake and ~30 minutes after po); verbalized comprehension. Patient will benefit from skilled intervention to address the above impairments. Patient's rehabilitation potential is considered to be  . Factors which may influence rehabilitation potential include:   []            None noted  []            Mental ability/status  [x]            Medical condition  []            Home/family situation and support systems  []            Safety awareness  []            Pain tolerance/management  []            Other:      PLAN :  Recommendations and Planned Interventions:  Recommendations  Requires SLP Intervention: Yes  Recommendations: Dysphagia treatment  D/C Recommendations: Ongoing speech therapy is recommended during this hospitalization  Liquid Consistency Recommendation: Full (per MD - advance to puree/liquid when MD clears)  Compensatory Swallowing Strategies : Eat/Feed slowly;Effortful swallow;Remain upright for 30-45 minutes after meals;Upright as possible for all oral intake;Small bites/sips;Swallow 2 times per bite/sip  Recommended Form of Meds: Meds in puree  Therapeutic Interventions: Diet tolerance monitoring; Therapeutic PO trials with SLP;Patient/Family education  Patient Education: educated on safe swallowing guidelines - double swallow d/t residue reported in pharyngeal area; upright position during/after meals; small bites/sips  Patient Education Response: Verbalizes understanding;Needs reinforcement    Frequency/Duration: Patient will be followed by speech-language pathology 1-2 times per day/3-5 days per week to address goals. Discharge Recommendations: D/C Recommendations: Ongoing speech therapy is recommended during this hospitalization     SUBJECTIVE:   Patient stated, \"I'm not drinking that water\". OBJECTIVE:     Past Medical History:   Diagnosis Date    Arthritis     osteosrthritis    Cancer ([facility] [address] 2019    Lung    Chronic kidney disease 2017    kidney stones multiple episodes    Kidney stones     Hx of    Sleep apnea     \"possible\"     Past Surgical History:   Procedure Laterality Date    CATARACT REMOVAL Bilateral 2012    CHEST SURGERY Right 2019    Lung Upper Lobectomy    ORTHOPEDIC SURGERY Left 1950    Foot partial amputation    TONSILLECTOMY      at 11 y/o    UROLOGICAL SURGERY  1960s    Cystoscopy, kidney stone removal     Prior Level of Function/Home Situation: did not assess     Baseline Assessment:  Baseline Assessment  Communication Observation: Functional  Follows Directions: Complex  Current Liquid Diet : Full  Dentition: Dentures top, Dentures bottom  Patient Positioning: Upright in bed  Baseline Vocal Quality: Normal  Volitional Cough: Strong  Prior Dysphagia History: n/a  Patient Complaint: swelling of tongue    Orientation:  Orientation  Overall Orientation Status: Within Normal Limits  Oral Assessment:  Oral Motor   Labial: No impairment  Dentition: Upper dentures; Lower dentures  Oral Hygiene Comments: adequate  Lingual: Decreased rate (swelling)  Velum: No Impairment  Mandible: No impairment  Gag: Other (comment) (did not test)     P.O.  Trials:  Neuromuscular Estim Used: No  Assessment Method(s): Observation;Palpation  Patient Position: HOB>45  Vocal Quality: No Impairment  Consistency Presented: thin, puree  How Presented: Self-fed/presented;Cup/sip  Bolus Acceptance: No impairment  Bolus Formation/Control: Impaired  Type of Impairment: Suspected premature spilling/ reduced bolus control d/t swelling  Propulsion: delayed  Oral Residue: None  Initiation of Swallow: No suspected impairment  Laryngeal Elevation: Functional  Aspiration Signs/Symptoms: None  Pharyngeal Phase Characteristics: Double swallow; Suspected pharyngeal residue  Effective Modifications: Double swallow  Cues for Modifications: Moderate    Dysphagia Exercises:  Effortful swallow, double swallow (due to c/o pharyngeal residue)     Oral Phase Severity: Mild- Mod (due to swelling)  Paryngeal Phase Severity: Mild    PAIN:  Start of Eval: 0  End of Eval: 0     After treatment:   []            Patient left in no apparent distress sitting up in chair  [x]            Patient left in no apparent distress in bed  [x]            Call bell left within reach  [x]            Nursing notified  [x]            Family present  []            Caregiver present  []            Bed alarm activated    COMMUNICATION/EDUCATION:   [x]            Aspiration precautions; swallow safety; compensatory techniques provided via demonstration, verbalization and teach back of comprehension  []         Patient/family have participated as able in goal setting and plan of care. []            Patient/family agree to work toward stated goals and plan of care. [x]            Patient understands intent and goals of therapy, neutral about participation. []            Patient unable to participate in goal setting/plan of care secondary to cognition, hearing/vision deficits; education ongoing with interdisciplinary staff   []            Handout regarding diet recommendations and thickener instructions provided. []         Posted safety precautions in patient's room.     Thank you for this referral,    Boo Hogan, SLP

## 2023-02-16 NOTE — ED TRIAGE NOTES
Pt presents via EMS with c/o swelling in tongue woke up with sx 2 hours ago no pain no difficulty breathing. Took allergy pill approx 2 hours ago and no relief. Reported similar episode to swelling of face and mouth 2 weeks ago, treated with unknown injection. Possibly related to an allergic reaction. Pt daughter in law reports that pt had CT scan with IV contrast yesterday possibly causing reaction as well.

## 2023-02-16 NOTE — H&P
History & Physical    Patient: Izabela Haider MRN: [id number]  CSN: [id number]    YOB: 1940  Age: 81 y.o. Sex: male      DOA: 2/16/2023    Chief Complaint:   Chief Complaint   Patient presents with    Oral Swelling       Active Hospital Problems    Diagnosis Date Noted    Adenocarcinoma of lung ([facility] [address] [C34.90] 02/16/2023     Priority: High    Angioedema of tongue [T78. 3XXA] 02/16/2023     Priority: Medium    Tobacco dependence syndrome [F17.200] 02/16/2023     Priority: Medium    Weight loss [R63.4] 02/16/2023     Priority: Medium    Lymphadenopathy [R59.1] 06/09/2022          HPI:     Izabela Haider is a 81 y.o.  male who who has history of recurrent lung cancer status post chemo and radiation therapy presents to our emergency room with complaints of tongue swelling and throat swelling that woke him up in the middle the night patient had just had a contrast CT of his chest abdomen and pelvis for staging at [facility] in the emergency room he was given steroids Benadryl and Pepcid with some improvement of his swelling of his tongue and neck patient is asked to be admitted for angioedema  Patient is followed by Dr. Chantel Wilde with [place] [facility] he continues to smoke  CT scan done at [facility]:  Chest:   1. Interval development of mediastinal lymphadenopathy. 2. Interval decrease in size of a right lower lobe pulmonary nodule now measuring 1.0 cm, previously 1.2 cm. 3. No new or enlarging pulmonary nodules. 4. Stable postsurgical changes involving right upper lobectomy. Abdomen pelvis:   1. No evidence of metastatic disease within the abdomen or pelvis. 2. Moderate length segment cervical ventral wall thickening of the jejunum within the left abdomen. Slight caliber change of the small bowel within the pelvis. Findings could reflect low-grade partial small bowel obstruction or be secondary to peristalsis. Recommend clinical correlation.       Past Medical History:   Diagnosis Date Arthritis     osteosrthritis    Cancer ([facility] [address] 2019    Lung    Chronic kidney disease 2017    kidney stones multiple episodes    Kidney stones     Hx of    Sleep apnea     \"possible\"       Past Surgical History:   Procedure Laterality Date    CATARACT REMOVAL Bilateral 2012    CHEST SURGERY Right 2019    Lung Upper Lobectomy    ORTHOPEDIC SURGERY Left 1950    Foot partial amputation    TONSILLECTOMY      at 11 y/o    UROLOGICAL SURGERY  1960s    Cystoscopy, kidney stone removal       No family history on file. Social History     Socioeconomic History    Marital status:      Spouse name: None    Number of children: None    Years of education: None    Highest education level: None   Tobacco Use    Smoking status: Some Days    Smokeless tobacco: Former     Quit date: 6/9/1965    Tobacco comments:     Quit smoking: Trying to quit, instructed not to smoke 24 hours prior to [employer]   Substance and Sexual Activity    Drug use: Never       Prior to Admission medications    Medication Sig Start Date End Date Taking? Authorizing Provider   clobetasol (TEMOVATE) 0.05 % ointment APPLY TOPICALLY TWICE DAILY A THIN LAYER TO THE AFFECTED AREAS OF BACK AND POSTERIOR NECK 2/4/23   Historical Provider, MD Kareem Nguyen 2.5-2.5 MCG/ACT AERS INHALE 2 PUFFS BY MOUTH AT THE SAME TIME EVERY DAY 1/6/23   Historical Provider, MD       Allergies   Allergen Reactions    Iv Contrast [Iodides] Angioedema     Possible cause of angioedema          Review of Systems  GENERAL: Patient alert, awake and oriented times 3, able to communicate full sentences and not in distress. HEENT: No change in vision, no earache, tinnitus, sore throat or sinus congestion. NECK: No pain or stiffness. PULMONARY: No shortness of breath, cough or wheeze. Cardiovascular: no pnd or orthopnea, no CP  GASTROINTESTINAL: No abdominal pain, nausea, vomiting or diarrhea, melena or bright red blood per rectum.    GENITOURINARY: No urinary frequency, urgency, hesitancy or dysuria. MUSCULOSKELETAL: No joint or muscle pain, no back pain, no recent trauma. DERMATOLOGIC: No rash, no itching, no lesions. ENDOCRINE: No polyuria, polydipsia, no heat or cold intolerance. No recent change in weight. HEMATOLOGICAL: No anemia or easy bruising or bleeding. NEUROLOGIC: No headache, seizures, numbness, tingling or weakness. Physical Exam:     Physical Exam:  /61   Pulse 68   Temp 98.4 °F (36.9 °C) (Oral)   Resp 23   Wt 144 lb 1 oz (65.3 kg)   SpO2 97%   BMI 22.56 kg/m²         Temp (24hrs), Av.4 °F (36.9 °C), Min:98.4 °F (36.9 °C), Max:98.4 °F (36.9 °C)    No intake/output data recorded. No intake/output data recorded. General:  Alert, cooperative, no distress, appears stated age. Head: Normocephalic, without obvious abnormality, atraumatic. Eyes:  Conjunctivae/corneas clear. PERRL, EOMs intact. Nose: Nares normal. No drainage or sinus tenderness. Swelling of tongue some swelling of his neck and throat area   Neck: Supple, symmetrical, trachea midline, no adenopathy, thyroid: no enlargement, no carotid bruit and no JVD. Lungs:   Clear to auscultation bilaterally. Diminished breath sounds   Heart:  Regular rate and rhythm, S1, S2 normal.  Tachycardia     Abdomen: Soft, non-tender. Bowel sounds normal.    Extremities: Extremities normal, atraumatic, no cyanosis or edema. Pulses: 2+ and symmetric all extremities. Skin:  No rashes or lesions   Neurologic: AAOx3, No focal motor or sensory deficit.        Labs Reviewed:       and EKG    Procedures/imaging: see electronic medical records for all procedures/Xrays and details which were not copied into this note but were reviewed prior to creation of Plan      Assessment/Plan     Patient Active Problem List    Diagnosis Date Noted    Adenocarcinoma of lung ([facility] [address] 2023    Angioedema of tongue 2023    Tobacco dependence syndrome 2023    Weight loss 2023 Malignant neoplasm of upper lobe of right lung ([facility] [address] 07/08/2019    Lymphadenopathy 06/09/2022    Lung nodule 06/09/2022    History of lung cancer 06/09/2022           1. Angioedema  ENT is consulted  He is admitted to a monitored unit  He started on Solu-Medrol Benadryl and Pepcid  No indication for FFP or itaband      2. Lung cancer  Repeat staging was ordered by [facility] we will have them see in consultation    3.   COPD  Started on DuoNebs as needed  Tobacco cessation advised  Declined nicotine patch      DVT/GI Prophylaxis: Lovenox        Ryanne Mcnamara MD  2/16/2023 2:46 PM

## 2023-02-16 NOTE — ED NOTES
Dr. Haris Bradshaw returned call, Dr. Allen Rhodes doing a central line, will page when he is available

## 2023-02-16 NOTE — ED PROVIDER NOTES
THE [facility], [address]       Pt Name: Tyler Dougherty  MRN: [id number]  [place] 1940  Date of evaluation: 2/16/2023  Provider: Esme Hughes MD   PCP: Sidney Mendez DO  Note Started: 2:14 PM 2/16/23     CHIEF COMPLAINT       Chief Complaint   Patient presents with    Oral Swelling        HISTORY OF PRESENT ILLNESS: 1 or more elements      History From: Patient and Patient's Wife     Tyler Dougherty is a 81 y.o. male who presents to ED complaining of angioedema. He reports that he woke up approximately 3 AM with a swollen tongue. He reports he has had this problem before but it was mostly right cheek and upper lip and was treated with steroids and got better. He has not taken anything for this angioedema. Patient's granddaughter who is a nurse practitioner reports patient had CT contrast yesterday. She is not sure if this is anything to do with angioedema. Patient denies any known allergies. Nursing Notes were all reviewed and agreed with or any disagreements were addressed in the HPI. REVIEW OF SYSTEMS      Review of Systems     Positives and Pertinent negatives as per HPI. PAST HISTORY     Past Medical History:  Past Medical History:   Diagnosis Date    Arthritis     osteosrthritis    Cancer ([facility] [address] 2019    Lung    Chronic kidney disease 2017    kidney stones multiple episodes    Kidney stones     Hx of    Sleep apnea     \"possible\"       Past Surgical History:  Past Surgical History:   Procedure Laterality Date    CATARACT REMOVAL Bilateral 2012    CHEST SURGERY Right 2019    Lung Upper Lobectomy    ORTHOPEDIC SURGERY Left 1950    Foot partial amputation    TONSILLECTOMY      at 11 y/o    UROLOGICAL SURGERY  1960s    Cystoscopy, kidney stone removal       Family History:  No family history on file.     Social History:  Social History     Tobacco Use    Smoking status: Some Days     Packs/day: 0.50     Types: Cigarettes    Smokeless tobacco: Former Quit date: 6/9/1965    Tobacco comments:     Quit smoking: Trying to quit, instructed not to smoke 24 hours prior to [facility]   Substance Use Topics    Drug use: Never       Allergies: Allergies   Allergen Reactions    Iv Contrast [Iodides] Angioedema     Possible cause of angioedema     Codeine Nausea And Vomiting       CURRENT MEDICATIONS      Discharge Medication List as of 2/17/2023 10:05 AM        CONTINUE these medications which have NOT CHANGED    Details   clobetasol (TEMOVATE) 0.05 % ointment APPLY TOPICALLY TWICE DAILY A THIN LAYER TO THE AFFECTED AREAS OF BACK AND POSTERIOR NECK, Historical Med      STIOLTO RESPIMAT 2.5-2.5 MCG/ACT AERS INHALE 2 PUFFS BY MOUTH AT THE SAME TIME EVERY DAY, DAWHistorical Med             SCREENINGS               No data recorded         PHYSICAL EXAM      Vitals:    02/17/23 0813 02/17/23 0839 02/17/23 0909 02/17/23 0939   BP:       Pulse:  76 77 85   Resp:  21 23 26   Temp:       TempSrc:       SpO2:  99% 98% 99%   Weight:       Height: 5' 9\" (1.753 m)        Physical Exam    Nursing notes and vital signs reviewed  AS above  Constitutional: Non toxic appearing,  no distress  Head: Normocephalic, Atraumatic  Obvious angioedema, patient however able to speak. Voice is low. Unable to visualize posterior limits well.   Eyes: EOMI  Neck: Supple  Cardiovascular: Regular rate and rhythm, no murmurs, rubs, or gallops  Chest: Normal work of breathing and chest excursion bilaterally  Lungs: Clear to ausculation bilaterally  Abdomen: Soft, non tender, non distended, normoactive bowel sounds  Back: No evidence of trauma or deformity  Extremities: No evidence of trauma or deformity, no LE edema  Skin: Warm and dry, normal cap refill  Neuro: Alert and appropriate, CN intact, normal speech, strength and sensation full and symmetric bilaterally  Psychiatric: Normal mood and affect     DIAGNOSTIC RESULTS   LABS:     Labs Reviewed   CBC WITH AUTO DIFFERENTIAL - Abnormal; Notable for the following components:       Result Value    RDW 14.7 (*)     MPV 8.6 (*)     Seg Neutrophils 91 (*)     Lymphocytes 5 (*)     Immature Granulocytes 1 (*)     Segs Absolute 10.2 (*)     Absolute Lymph # 0.6 (*)     Absolute Immature Granulocyte 0.1 (*)     All other components within normal limits   COMPREHENSIVE METABOLIC PANEL - Abnormal; Notable for the following components:    Glucose 139 (*)     ALT 14 (*)     Albumin 3.0 (*)     All other components within normal limits   CBC WITH AUTO DIFFERENTIAL - Abnormal; Notable for the following components:    RDW 14.8 (*)     Seg Neutrophils 93 (*)     Lymphocytes 5 (*)     Monocytes 2 (*)     Absolute Lymph # 0.4 (*)     All other components within normal limits   BASIC METABOLIC PANEL - Abnormal; Notable for the following components:    Glucose 175 (*)     All other components within normal limits   MAGNESIUM   C-1 ESTERASE INHIBITOR        RADIOLOGY:  Non-plain film images such as CT, Ultrasound and MRI are read by the radiologist. Plain radiographic images are visualized and preliminarily interpreted by the ED Provider with the below findings:          Interpretation per the Radiologist below, if available at the time of this note:     XR CHEST 1 VIEW   Final Result   No acute process or significant change. PROCEDURES   Unless otherwise noted below, none       CRITICAL CARE TIME     CRITICAL CARE Documentation: This patient is critically ill and there is a high probability of of imminent or life threatening deterioration in the patient's condition without immediate management. The nature of the patient's clinical problem is: angioedema    I have spent 60 min time in direct patient care, documentation, review of labs/xrays/old records, discussion with Family, ENT, Dr Dena Bardales  . The time involved in the performance of separately reportable procedures was not counted toward critical care time.      Lorenda Osler, MD; 2/17/2023 @2:14 PM EMERGENCY DEPARTMENT COURSE and DIFFERENTIAL DIAGNOSIS/MDM   Vitals:    Vitals:    02/17/23 0813 02/17/23 0839 02/17/23 0909 02/17/23 0939   BP:       Pulse:  76 77 85   Resp:  21 23 26   Temp:       TempSrc:       SpO2:  99% 98% 99%   Weight:       Height: 5' 9\" (1.753 m)           Patient was given the following medications:  Medications   EPINEPHrine PF 1 MG/ML injection (Anaphylaxis) 0.3 mg (0.3 mg IntraMUSCular Given 2/16/23 8696)   methylPREDNISolone sodium (SOLU-MEDROL) injection 125 mg (125 mg IntraVENous Given 2/16/23 0622)   famotidine (PEPCID) 20 mg in sodium chloride (PF) 0.9 % 10 mL injection (20 mg IntraVENous Given 2/16/23 1827)   diphenhydrAMINE (BENADRYL) injection 25 mg (25 mg IntraVENous Given 2/16/23 5849)   diphenhydrAMINE (BENADRYL) injection 25 mg (25 mg IntraVENous Given 2/16/23 0828)   dexamethasone (DECADRON) injection 4 mg/mL (4 mg IntraVENous Given 2/16/23 0828)       CONSULTS: (Who and What was discussed)  IP CONSULT TO OTOLARYNGOLOGY  IP CONSULT TO HOSPITALIST  IP CONSULT TO PULMONOLOGY  IP CONSULT TO HEMATOLOGY  IP CONSULT TO HEMATOLOGY  Discussed patient with Dr. London Goins who will follow up with consult if patient is admitted. Chronic Conditions:     Social Determinants affecting Dx or Tx:  none    Records Reviewed (source and summary): Nursing Notes, Old Medical Records, Previous electrocardiograms, Previous Radiology Studies, and Previous Laboratory Studies    CC/HPI Summary, DDx, ED Course, and Reassessment:     81-year-old male presents to ED with angioedema which woke him up approximately 3 AM.  He is a poor historian due to angioedema. Patient's wife and daughter our however reports patient had CT with contrast yesterday. They are wondering if his allergic to it. Patient had similar episode several months ago but he only got swelling of the right cheek and right lip.   On exam he appears in no acute distress, he does not have difficulty breathing however his tongue is quite swollen. He is able to speak slowly. He denies shortness of breath. IV was inserted, patient given Solu-Medrol 125 mg, Benadryl 25 mg, Pepcid and epi 0.3 mg IM. He was observed for an hour and there was minimal improvement which patient reported. I discussed admission at that time with patient and family however patient felt he was improving  and preferred further observation in ED and I gave more Benadryl and Decadron 4 mg. Repeat exam showed no significant improvement. Discussed with Dr. Grace Jarquin who will see patient if he is admitted. I discussed above case with Dr. Elsy Farmer who is assuming care. Disposition Considerations (Tests not done, Shared Decision Making, Pt Expectation of Test or Tx.):        FINAL IMPRESSION     1.  Angioedema, initial encounter          DISPOSITION/PLAN   DISPOSITION Admitted 02/16/2023 12:35:51 PM  PATIENT REFERRED TO:  [unfilled]      DISCHARGE MEDICATIONS:     Medication List        START taking these medications      cetirizine 10 MG tablet  Commonly known as: ZYRTEC  Take 1 tablet by mouth daily for 5 days     EPINEPHrine 0.3 MG/0.3ML Soaj injection  Commonly known as: EpiPen 2-Shadi  Inject 0.3 mLs into the muscle once for 1 dose Use as directed for allergic reaction     famotidine 20 MG tablet  Commonly known as: PEPCID  Take 1 tablet by mouth 2 times daily for 5 days     predniSONE 10 MG tablet  Commonly known as: DELTASONE  Take 2 tablets by mouth 2 times daily for 5 days            CONTINUE taking these medications      clobetasol 0.05 % ointment  Commonly known as: TEMOVATE     Stiolto Respimat 2.5-2.5 MCG/ACT Aers  Generic drug: tiotropium-olodaterol               Where to Get Your Medications        These medications were sent to [facility], [address] [place], [place] [address] [place] [address]   [facility], [address] [facility] [address]      Phone: [phone]   cetirizine 10 MG tablet  EPINEPHrine 0.3 MG/0.3ML Soaj injection  famotidine 20 MG tablet  predniSONE 10 MG tablet           DISCONTINUED MEDICATIONS:  Discharge Medication List as of 2/17/2023 10:05 AM          I am the Primary Clinician of Record. Fidelia Landeros MD (electronically signed)    (Please note that parts of this dictation were completed with voice recognition software. Quite often unanticipated grammatical, syntax, homophones, and other interpretive errors are inadvertently transcribed by the computer software. Please disregards these errors.  Please excuse any errors that have escaped final proofreading.)         Gypsy Hussein MD  02/17/23 7639

## 2023-02-16 NOTE — CONSULTS
[facility]  Pulmonary, Critical Care, and Sleep Medicine    Name: Janneth Jeffery MRN: [id number]   : 1940 Hospital: [facility] [place]    Date: 2023  Room: 02/02     [facility] Note                                              Consult requesting physician: Dr. Pushpa Wu  Reason for Consult: angioedema    IMPRESSION:     Angioedema  Mediastinal adenopathy  Lung nodule  History of lung cancer       Patient Active Problem List   Diagnosis Code    Lymphadenopathy R59.1    Lung nodule R91.1    History of lung cancer Z85.118    Angioedema of tongue T78. 3XXA       Code status: Full Code       RECOMMENDATIONS:     Respiratory: Stable respirations; on room air. No wheezing or stridor. Awaiting ENT eval; likely contrast allergy; iv solumedrol and benadryl. Known patient with history of lung cancer; known to Dr Lona Kulkarni in oncology. Known patient with COPD; known to Dr Lily Hayward. On stiolto inhaler at home. Duoneb qid in hospital.   CXR done and reviewed images - s/p RT UL lobectomy; right sided ribs resection' no focal mass or pleural effusion; left UL peripheral scarring changes. Keep SPO2 >=92%. HOB 30 degree elevation all the time. Aspiration precautions. Incentive spirometry. CVS: Stable hemodynamics-monitor. Tele- sinus rhythm. ID: No active issues. Hematology/Oncology: check cbc. Renal: check bmp. GI: Npo except water/ice chips; swallow eval.  Endocrine: Monitor BS. Neurology: normal mentation. IVF:  ml/hr. Nutrition: SLP eval.  Prophylaxis: DVT Prophylaxis: SCDs. GI Prophylaxis: pepcid. Lines/Tubes: PIVs  Quality Care: PPI, DVT prophylaxis, HOB elevated, Infection control all reviewed and addressed. Care of plan d/w Dr Chandni Neal. D/w patient, family wife and son at bedside in ER; updated medical management (answered all questions to satisfaction).      High complexity decision making was performed during the evaluation of this patient at high risk for decompensation with multiple organ involvement. Total critical care time spent rendering care exclusive of procedures/family discussion/coordination of care: 40 minutes. Subjective/History of Present Illness:     Patient is a 81 y.o. male with PMHx significant for COPD, smoker, history of lung cancer. Status post right upper lobe resection in past.  Patient seeing Dr Fartun Paul for metastatic cancer disease. S/p EBUS 6/9/2022 - [facility] LN 7 and 4L - metastatic adenocarcinoma. Patient has CT chest with iv contrast yesterday. He came to ER today with worsening swelling in tongue. Patient given steroids and benadryl in ER. Patient seen in ER. He is better. He is able to talk. He denies shortness of breath. No chest pains. No cough or wheezing. He is not on home O2. He is on Stiolto at home and sees Dr Yana Serna in our office.       Review of Systems:   HEENT: No epistaxis, no nasal drainage, no difficulty in swallowing  Respiratory: as above  Cardiovascular: no chest pain, no palpitations, no chronic leg edema  Gastrointestinal: no abd pain, no vomiting, no diarrhea  Genitourinary: No urinary symptoms or hematuria  Neurological: No focal weakness, no seizures, no headaches  Behvioral/Psych: No anxiety, no depression  Constitutional: No fever, no chills, no weight loss      Allergies   Allergen Reactions    Iv Contrast [Iodides] Angioedema     Possible cause of angioedema       Past Medical History:   Diagnosis Date    Arthritis     osteosrthritis    Cancer ([facility] [address] 2019    Lung    Chronic kidney disease 2017    kidney stones multiple episodes    Kidney stones     Hx of    Sleep apnea     \"possible\"      Past Surgical History:   Procedure Laterality Date    CATARACT REMOVAL Bilateral 2012    CHEST SURGERY Right 2019    Lung Upper Lobectomy    ORTHOPEDIC SURGERY Left 1950    Foot partial amputation    TONSILLECTOMY      at 11 y/o    UROLOGICAL SURGERY  1960s    Cystoscopy, kidney stone removal Social History     Tobacco Use    Smoking status: Some Days    Smokeless tobacco: Former     Quit date: 6/9/1965    Tobacco comments:     Quit smoking: Trying to quit, instructed not to smoke 24 hours prior to [facility]   Substance Use Topics    Alcohol use: Not on file      No family history on file. Prior to Admission medications    Not on File     Current Facility-Administered Medications   Medication Dose Route Frequency Provider Last Rate Last Admin    methylPREDNISolone sodium (SOLU-MEDROL) injection 40 mg  40 mg IntraVENous Q6H Rena Tompkins MD        diphenhydrAMINE (BENADRYL) injection 25 mg  25 mg IntraVENous Q12H Rena Tompkins MD        0.9 % sodium chloride infusion   IntraVENous Continuous Rena Tompkins MD         No current outpatient medications on file. Objective:    Intake/Output:   No intake or output data in the 24 hours ending 02/16/23 1303    Vital Signs:    /71   Pulse 77   Temp 98.4 °F (36.9 °C) (Oral)   Resp 18   Wt 144 lb 1 oz (65.3 kg)   SpO2 98%   BMI 22.56 kg/m²     Weight:  Wt Readings from Last 3 Encounters:   02/16/23 144 lb 1 oz (65.3 kg)   05/31/22 135 lb (61.2 kg)        Physical Exam:     Comfortable; on room air; acyanotic  HEENT: pupils not dilated, no scleral jaundice, moist oral mucosa, no drooling, tongue swollen; mallampati 3  Neck: No adenopathy or thyroid swelling; no stridor  CVS: S1S2 no murmurs; JVD not elevated  RS: Good air entry bilaterally, normal respirations, no wheezes or crackles  Abd: soft, non tender, no hepatosplenomegaly, no abd distension, no guarding or rigidity, bowel sounds heard  Neuro: non focal, awake, alert; good symmetrical strength all 4 extremities; no facial droop  Extrm: no leg edema or swelling or clubbing  Skin: no rash  Lymphatic: no cervical or supraclavicular adenopathy  Psych: cooperative      Data:       Labs not done yet     Images report reviewed by me:    CXR reviewed by me:  XR (Most Recent). Please note: Voice-recognition software may have been used to generate this report, which may have resulted in some phonetic-based errors in grammar and contents. Even though attempts were made to correct all the mistakes, some may have been missed, and remained in the body of the document.       Blaine Lara MD  2/16/2023

## 2023-02-17 VITALS
HEIGHT: 69 IN | HEART RATE: 85 BPM | OXYGEN SATURATION: 99 % | TEMPERATURE: 97.7 F | BODY MASS INDEX: 18.42 KG/M2 | WEIGHT: 124.34 LBS | SYSTOLIC BLOOD PRESSURE: 127 MMHG | DIASTOLIC BLOOD PRESSURE: 66 MMHG | RESPIRATION RATE: 26 BRPM

## 2023-02-17 LAB
ALBUMIN SERPL-MCNC: 3 G/DL (ref 3.4–5)
ALBUMIN/GLOB SERPL: 0.8 (ref 0.8–1.7)
ALP SERPL-CCNC: 107 U/L (ref 45–117)
ALT SERPL-CCNC: 14 U/L (ref 16–61)
ANION GAP SERPL CALC-SCNC: 5 MMOL/L (ref 3–18)
ANION GAP SERPL CALC-SCNC: 5 MMOL/L (ref 3–18)
AST SERPL-CCNC: 11 U/L (ref 10–38)
BASOPHILS # BLD: 0 K/UL (ref 0–0.1)
BASOPHILS # BLD: 0 K/UL (ref 0–0.1)
BASOPHILS NFR BLD: 0 % (ref 0–2)
BASOPHILS NFR BLD: 0 % (ref 0–2)
BILIRUB SERPL-MCNC: 0.3 MG/DL (ref 0.2–1)
BUN SERPL-MCNC: 17 MG/DL (ref 7–18)
BUN SERPL-MCNC: 18 MG/DL (ref 7–18)
BUN/CREAT SERPL: 17 (ref 12–20)
BUN/CREAT SERPL: 19 (ref 12–20)
CALCIUM SERPL-MCNC: 8.5 MG/DL (ref 8.5–10.1)
CALCIUM SERPL-MCNC: 9 MG/DL (ref 8.5–10.1)
CHLORIDE SERPL-SCNC: 108 MMOL/L (ref 100–111)
CHLORIDE SERPL-SCNC: 109 MMOL/L (ref 100–111)
CO2 SERPL-SCNC: 25 MMOL/L (ref 21–32)
CO2 SERPL-SCNC: 26 MMOL/L (ref 21–32)
CREAT SERPL-MCNC: 0.93 MG/DL (ref 0.6–1.3)
CREAT SERPL-MCNC: 0.98 MG/DL (ref 0.6–1.3)
DIFFERENTIAL METHOD BLD: ABNORMAL
DIFFERENTIAL METHOD BLD: ABNORMAL
EOSINOPHIL # BLD: 0 K/UL (ref 0–0.4)
EOSINOPHIL # BLD: 0 K/UL (ref 0–0.4)
EOSINOPHIL NFR BLD: 0 % (ref 0–5)
EOSINOPHIL NFR BLD: 0 % (ref 0–5)
ERYTHROCYTE [DISTWIDTH] IN BLOOD BY AUTOMATED COUNT: 14.7 % (ref 11.6–14.5)
ERYTHROCYTE [DISTWIDTH] IN BLOOD BY AUTOMATED COUNT: 14.8 % (ref 11.6–14.5)
GLOBULIN SER CALC-MCNC: 3.8 G/DL (ref 2–4)
GLUCOSE SERPL-MCNC: 139 MG/DL (ref 74–99)
GLUCOSE SERPL-MCNC: 175 MG/DL (ref 74–99)
HCT VFR BLD AUTO: 40.2 % (ref 36–48)
HCT VFR BLD AUTO: 40.9 % (ref 36–48)
HGB BLD-MCNC: 13.2 G/DL (ref 13–16)
HGB BLD-MCNC: 13.2 G/DL (ref 13–16)
IMM GRANULOCYTES # BLD AUTO: 0 K/UL (ref 0–0.04)
IMM GRANULOCYTES # BLD AUTO: 0.1 K/UL (ref 0–0.04)
IMM GRANULOCYTES NFR BLD AUTO: 0 % (ref 0–0.5)
IMM GRANULOCYTES NFR BLD AUTO: 1 % (ref 0–0.5)
LYMPHOCYTES # BLD: 0.4 K/UL (ref 0.9–3.6)
LYMPHOCYTES # BLD: 0.6 K/UL (ref 0.9–3.6)
LYMPHOCYTES NFR BLD: 5 % (ref 21–52)
LYMPHOCYTES NFR BLD: 5 % (ref 21–52)
MAGNESIUM SERPL-MCNC: 2.2 MG/DL (ref 1.6–2.6)
MCH RBC QN AUTO: 28.5 PG (ref 24–34)
MCH RBC QN AUTO: 28.8 PG (ref 24–34)
MCHC RBC AUTO-ENTMCNC: 32.3 G/DL (ref 31–37)
MCHC RBC AUTO-ENTMCNC: 32.8 G/DL (ref 31–37)
MCV RBC AUTO: 87.6 FL (ref 78–100)
MCV RBC AUTO: 88.3 FL (ref 78–100)
MONOCYTES # BLD: 0.1 K/UL (ref 0.05–1.2)
MONOCYTES # BLD: 0.3 K/UL (ref 0.05–1.2)
MONOCYTES NFR BLD: 2 % (ref 3–10)
MONOCYTES NFR BLD: 3 % (ref 3–10)
NEUTS SEG # BLD: 10.2 K/UL (ref 1.8–8)
NEUTS SEG # BLD: 7 K/UL (ref 1.8–8)
NEUTS SEG NFR BLD: 91 % (ref 40–73)
NEUTS SEG NFR BLD: 93 % (ref 40–73)
NRBC # BLD: 0 K/UL (ref 0–0.01)
NRBC # BLD: 0 K/UL (ref 0–0.01)
NRBC BLD-RTO: 0 PER 100 WBC
NRBC BLD-RTO: 0 PER 100 WBC
PLATELET # BLD AUTO: 185 K/UL (ref 135–420)
PLATELET # BLD AUTO: 199 K/UL (ref 135–420)
PMV BLD AUTO: 8.6 FL (ref 9.2–11.8)
PMV BLD AUTO: 9.3 FL (ref 9.2–11.8)
POTASSIUM SERPL-SCNC: 4.3 MMOL/L (ref 3.5–5.5)
POTASSIUM SERPL-SCNC: 4.4 MMOL/L (ref 3.5–5.5)
PROT SERPL-MCNC: 6.8 G/DL (ref 6.4–8.2)
RBC # BLD AUTO: 4.59 M/UL (ref 4.35–5.65)
RBC # BLD AUTO: 4.63 M/UL (ref 4.35–5.65)
SODIUM SERPL-SCNC: 139 MMOL/L (ref 136–145)
SODIUM SERPL-SCNC: 139 MMOL/L (ref 136–145)
WBC # BLD AUTO: 11.1 K/UL (ref 4.6–13.2)
WBC # BLD AUTO: 7.5 K/UL (ref 4.6–13.2)

## 2023-02-17 PROCEDURE — 85025 COMPLETE CBC W/AUTO DIFF WBC: CPT

## 2023-02-17 PROCEDURE — 6360000002 HC RX W HCPCS: Performed by: INTERNAL MEDICINE

## 2023-02-17 PROCEDURE — 83735 ASSAY OF MAGNESIUM: CPT

## 2023-02-17 PROCEDURE — 36415 COLL VENOUS BLD VENIPUNCTURE: CPT

## 2023-02-17 PROCEDURE — 6370000000 HC RX 637 (ALT 250 FOR IP): Performed by: INTERNAL MEDICINE

## 2023-02-17 PROCEDURE — 80053 COMPREHEN METABOLIC PANEL: CPT

## 2023-02-17 PROCEDURE — 94640 AIRWAY INHALATION TREATMENT: CPT

## 2023-02-17 RX ORDER — EPINEPHRINE 0.3 MG/.3ML
0.3 INJECTION SUBCUTANEOUS ONCE
Qty: 0.3 ML | Refills: 0 | Status: SHIPPED | OUTPATIENT
Start: 2023-02-17 | End: 2023-02-17

## 2023-02-17 RX ORDER — CETIRIZINE HYDROCHLORIDE 10 MG/1
10 TABLET ORAL DAILY
Qty: 5 TABLET | Refills: 0 | Status: SHIPPED | OUTPATIENT
Start: 2023-02-17 | End: 2023-02-22

## 2023-02-17 RX ORDER — PREDNISONE 10 MG/1
20 TABLET ORAL 2 TIMES DAILY
Qty: 10 TABLET | Refills: 1 | Status: SHIPPED | OUTPATIENT
Start: 2023-02-17 | End: 2023-02-22

## 2023-02-17 RX ORDER — FAMOTIDINE 20 MG/1
20 TABLET, FILM COATED ORAL 2 TIMES DAILY
Qty: 60 TABLET | Refills: 3 | Status: SHIPPED | OUTPATIENT
Start: 2023-02-17 | End: 2023-02-22

## 2023-02-17 RX ADMIN — DIPHENHYDRAMINE HYDROCHLORIDE 25 MG: 50 INJECTION, SOLUTION INTRAMUSCULAR; INTRAVENOUS at 05:45

## 2023-02-17 RX ADMIN — METHYLPREDNISOLONE SODIUM SUCCINATE 40 MG: 40 INJECTION, POWDER, FOR SOLUTION INTRAMUSCULAR; INTRAVENOUS at 01:59

## 2023-02-17 RX ADMIN — IPRATROPIUM BROMIDE AND ALBUTEROL SULFATE 1 AMPULE: .5; 2.5 SOLUTION RESPIRATORY (INHALATION) at 08:08

## 2023-02-17 RX ADMIN — METHYLPREDNISOLONE SODIUM SUCCINATE 40 MG: 40 INJECTION, POWDER, FOR SOLUTION INTRAMUSCULAR; INTRAVENOUS at 06:52

## 2023-02-17 ASSESSMENT — PAIN SCALES - GENERAL: PAINLEVEL_OUTOF10: 0

## 2023-02-17 NOTE — CONSULTS
[id number] [facility]    Name:  Hollie Johnson  MR#:   [id number]  :  1940  ACCOUNT #:  [de-identified]  DATE OF SERVICE:  2023    REASON FOR CONSULTATION:  Angioedema. HISTORY OF PRESENT ILLNESS:  The patient is an 81-year-old male with past medical history significant for COPD, has also history of lung cancer status post upper lobe resection in the past.  The patient has been seen by Dr. Tadeo Goode for metastatic cancer, adenocarcinoma in nature. The patient apparently had a CT scan yesterday with IV contrast.  He came into the emergency room this a.m. after waking at approximately 0300 with difficulty, swelling in the tongue which did appear to worsen for approximately 6 hours. The patient was subsequently admitted, was placed on medical therapy with significant improvement. He is eating well at the present time having a soft diet. In the emergency room, the patient was given steroids, Benadryl, and Pepcid with improvement of his tongue swelling. Upon discussion with the patient, he has had a previous issue with some significant facial swelling approximately 2 weeks ago. He, however, states this was at the right facial area and the upper lip, which did appear to resolve on its own accord. No evidence of any other significant findings are noted. The patient is utilizing chemotherapy as provided by [facility]. [facility] consult apparently pending at this point. PAST MEDICAL HISTORY:  Significant for osteoarthritis, lung carcinoma, kidney stones, sleep apnea. PAST SURGICAL HISTORY:  Includes cataract extraction; left upper lobe lobectomy; partial foot amputation, left side; tonsillectomy; and urologic surgery. SOCIAL HISTORY:  The patient is . Does admit to smoking use. ALLERGIES:  QUESTIONABLE IV CONTRAST ALLERGY WITH PRESENT ANGIOEDEMA ???.    PHYSICAL EXAMINATION:  GENERAL:  Reveals a well-developed, well-nourished, very pleasant male, in no distress.   HEENT: Intraoral exam reveals no evidence of any significant abnormality. The tongue is within normal limits without evidence of any significant findings. The floor of the mouth is unremarkable as well without evidence of any significant swelling. The intranasal exam reveals no evidence of any mucopurulent discharge. NECK:  Supple and nontender. No masses are palpable. CHEST:  Distant breath sounds. HEART:  S1 and S2. No murmur audible. EXTREMITIES:  Within normal limits. IMPRESSION:  1. Resolved angioedema. 2.  The patient with questionable history of similar issues 2 weeks prior. PLAN: We will await Dr. Matute Hickory e-mail. I suspect that the patient may have had an allergic reaction to some of his chemotherapeutic agents. I am uncertain which agent the patient is utilizing at this point; however, certainly may be the case. At the present time, I really see no abnormality. At this point, we will hold off any intervention. I do not feel the patient needs flexible endoscopy at this point. I strongly suspect that he probably will be able to be discharged in the a.m. This was discussed with the patient who understands and aware. Thank you for the consult.       MD REGINO Coleman/DULCE [id number]  D:  02/16/2023 20:56  T:  02/17/2023 0:43  JOB #:  [id number]

## 2023-02-17 NOTE — DISCHARGE SUMMARY
Discharge Summary    Patient: Wally Child MRN: [id number]  CSN: [id number]    YOB: 1940  Age: 81 y.o. Sex: male    DOA: 2/16/2023 LOS:  LOS: 1 day   Discharge Date:      Primary Care Provider:  Rosy Landeros DO    Admission Diagnoses: Encephalopathy [G93.40]  Angioedema, initial encounter Yang Mclain. 3XXA]  Angioedema of tongue [T78. 3XXA]    Discharge Diagnoses: Active Hospital Problems    Diagnosis Date Noted    Adenocarcinoma of lung [facility]) [C34.90] 02/16/2023     Priority: High    Angioedema of tongue [T78. 3XXA] 02/16/2023     Priority: Medium    Tobacco dependence syndrome [F17.200] 02/16/2023     Priority: Medium    Weight loss [R63.4] 02/16/2023     Priority: Medium    Lymphadenopathy [R59.1] 06/09/2022         Discharge Medications:        Medication List        START taking these medications      cetirizine 10 MG tablet  Commonly known as: ZYRTEC  Take 1 tablet by mouth daily for 5 days     EPINEPHrine 0.3 MG/0.3ML Soaj injection  Commonly known as: EpiPen 2-Shadi  Inject 0.3 mLs into the muscle once for 1 dose Use as directed for allergic reaction     famotidine 20 MG tablet  Commonly known as: PEPCID  Take 1 tablet by mouth 2 times daily for 5 days     predniSONE 10 MG tablet  Commonly known as: DELTASONE  Take 2 tablets by mouth 2 times daily for 5 days            CONTINUE taking these medications      clobetasol 0.05 % ointment  Commonly known as: TEMOVATE     Stiolto Respimat 2.5-2.5 MCG/ACT Aers  Generic drug: tiotropium-olodaterol               Where to Get Your Medications        These medications were sent to [address] [place] [address], [address] [place] [address] -  [phone]  [address] [place] [address], [address] [place] [id number]      Phone: [phone]   cetirizine 10 MG tablet  EPINEPHrine 0.3 MG/0.3ML Soaj injection  famotidine 20 MG tablet  predniSONE 10 MG tablet           Discharge Condition: Good    Procedures : none    Consults: Pulmonary/Critical Care voa      PHYSICAL EXAM   Visit Vitals  /66   Pulse 63   Temp 97.7 °F (36.5 °C) (Oral)   Resp 21   Ht 5' 9\" (1.753 m)   Wt 124 lb 5.4 oz (56.4 kg)   SpO2 97%   BMI 18.36 kg/m²     Ge  Admission HPI :   Janneth Jeffery is a 81 y.o.  male who who has history of recurrent lung cancer status post chemo and radiation therapy presents to our emergency room with complaints of tongue swelling and throat swelling that woke him up in the middle the night patient had just had a contrast CT of his chest abdomen and pelvis for staging at [facility] in the emergency room he was given steroids Benadryl and Pepcid with some improvement of his swelling of his tongue and neck patient is asked to be admitted for angioedema  Patient is followed by Dr. Marin Espinosa with [facility] he continues to smoke  CT scan done at [facility]:  Chest:   Hospital Course :   1. Angioedema  ENT is consulted  He is admitted to a monitored unit  He started on Solu-Medrol Benadryl and Pepcid  No indication for FFP or itaband  Patient improved in 24 hrs and will be d/c on 5 days of prednisone    he is also being sent with epi pen that he should have at all times  In case this were to re occur   2. Lung cancer  Repeat staging was ordered by [facility] seen by Dr. Montero Read  Recomends c1 esterase testing to determine type of angioedema and possiblitiy of reaction to immunotherapy    3.   COPD  Started on DuoNebs as needed  Tobacco cessation advised  Declined nicotine patch  Activity: activity as tolerated    Diet: regular diet    Follow-up: PCP and oncology     Disposition: home      Minutes spent on discharge: 35       Labs: Results:       Chemistry Recent Labs     02/16/23  2312 02/17/23  0339   GLUCOSE 175* 139*    139   K 4.3 4.4    108   CO2 25 26   BUN 18 17   CREATININE 0.93 0.98   CALCIUM 8.5 9.0   BILITOT  --  0.3   AST  --  11   ALT  --  14*   ALKPHOS  --  107   PROT  --  6.8   GLOB  --  3.8   LABGLOM >60 >60 CBC w/Diff Recent Labs     02/16/23  2312 02/17/23  0339   WBC 7.5 11.1   RBC 4.59 4.63   HGB 13.2 13.2   HCT 40.2 40.9    185   LYMPHOPCT 5* 5*      Cardiac Enzymes No results for input(s): CKTOTAL, CKMB, CKMBINDEX, TROPONINI, RAVINDRA in the last 72 hours. Coagulation No results for input(s): PROTIME, INR, APTT in the last 72 hours. Lipid Panel No results found for: CHOL, TRIG, HDL, LDLCHOLESTEROL, LDLCALC, LABVLDL, VLDL, CHOLHDLRATIO   BNP No results for input(s): BNP in the last 72 hours. Liver Enzymes Recent Labs     02/17/23  0339   ALT 14*   AST 11   ALKPHOS 107   BILITOT 0.3      Thyroid Studies No results found for: B2WZQHI, S7MJWVH, FT3, T4FREE, TSH         Significant Diagnostic Studies: XR CHEST 1 VIEW    Result Date: 2/16/2023  INDICATION:  angioedema COMPARISON: June 2022 FINDINGS: Single AP portable view of the chest obtained at 1310 demonstrates a stable cardiomediastinal silhouette. There is no focal airspace disease. There are chronic postsurgical changes involving the right ribs. There is left upper lobe linear scarring versus atelectasis, as well as volume loss in the right lung. Emphysematous changes are noted. No osseous abnormalities are seen. No acute process or significant change. No results found for this or any previous visit.            CC: Francisco Haddad,

## 2023-02-17 NOTE — PROGRESS NOTES
Comprehensive Nutrition Assessment    Type and Reason for Visit:  Initial, Positive Nutrition Screen    Nutrition Recommendations/Plan:   Continue current diet as tolerated by pt. Encourage PO intake. MD  Continue to monitor PO intake, weight, and POC while admitted. Malnutrition Assessment:  Malnutrition Status: At risk for malnutrition (Comment) (r/t pt with tongue swelling (resolved currently) and intake <50% since admission) (02/17/23 1010)      Nutrition History and Allergies:   PMHx of recurrent lung cancer s/p chemo and radiation therapy, CKD, lymphadenopathy. Pt came in c/o tongue swelling and throat swelling that woke him up- given steroids and benadryl in ER with improvement. Nutrition Assessment:    Seen by SLP- initially recommended full liquids with advancement to purees with thin liquids when cleared by MD. On pureed diet currently. Wt hx review: c wt- 124.3 lb (bed scale); 6/09/22- 133.1 lb (-6.6%). Limited wt hx available- no significant wt loss documented. One entry for meal intake 26-50%. Spoke with attending MD- pt set to d/c today. Will reassess need for oral nutrition supplements if pt remains through weekend. Nutrition Related Findings:    Last BM not documented. Labs and pertinent meds reviewed. Wound Type: None       Current Nutrition Intake & Therapies:    Average Meal Intake: 26-50%  Average Supplements Intake: None Ordered  ADULT DIET; Dysphagia - Pureed    Anthropometric Measures:  Height: 5' 9\" (175.3 cm)  Ideal Body Weight (IBW): 160 lbs (73 kg)       Current Body Weight: 124 lb 4.8 oz (56.4 kg), 77.7 % IBW.  Weight Source: Bed Scale  Current BMI (kg/m2): 18.3  Usual Body Weight: 133 lb 1.6 oz (60.4 kg) (6/09/22)  % Weight Change (Calculated): -6.6  Weight Adjustment For: No Adjustment  BMI Categories: Underweight (BMI less than 22) age over 72    Estimated Daily Nutrient Needs:  Energy Requirements Based On: Kcal/kg (30-35)  Weight Used for Energy Requirements: Current  Energy (kcal/day): 1692 - 1974  Weight Used for Protein Requirements: Current (1-1.5)  Protein (g/day): 56 - 85  Method Used for Fluid Requirements: 1 ml/kcal  Fluid (ml/day): 3526 - 1974    Nutrition Diagnosis:   Inadequate oral intake related to swallowing difficulty as evidenced by intake 26-50% (tongue swelling-resolved)    Nutrition Interventions:   Food and/or Nutrient Delivery: Continue Current Diet  Nutrition Education/Counseling: No recommendation at this time, Education not indicated  Coordination of Nutrition Care: Continue to monitor while inpatient  Plan of Care discussed with: MD    Goals:     Goals: Meet at least 75% of estimated needs, by next RD assessment       Nutrition Monitoring and Evaluation:      Food/Nutrient Intake Outcomes: Food and Nutrient Intake, IVF Intake  Physical Signs/Symptoms Outcomes: Meal Time Behavior, Weight, Chewing or Swallowing    Discharge Planning:    Continue current diet     Mir Number, [address] [place] [address]  Contact: [phone]

## 2023-02-17 NOTE — PROGRESS NOTES
[facility]  Pulmonary, Critical Care, and Sleep Medicine    Name: Sandra Siddiqi MRN: [id number]   : 1940 Hospital: [facility] [place]    Date: 2023  Room: [facility]/[address] [place] [address] Note                                              Consult requesting physician: Dr. Monroe Stauffer  Reason for Consult: angioedema    IMPRESSION:     Angioedema  Mediastinal adenopathy  Lung nodule  History of lung cancer       Patient Active Problem List   Diagnosis Code    Lymphadenopathy R59.1    Lung nodule R91.1    History of lung cancer Z85.118    Angioedema of tongue T78. 3XXA    Adenocarcinoma of lung ([facility] [address] C34.90    Tobacco dependence syndrome F17.200    Weight loss R63.4    Malignant neoplasm of upper lobe of right lung (HCC) C34.11       Code status: Full Code       RECOMMENDATIONS:     Respiratory: Stable respirations; on room air. Tongue swelling resolved. Patient family reports similar history 2 weeks ago without inciting factor; patient did not eat seafood; not on ACE inhibitor; immunotherapy for lung cancer being suspected. Recommend patient going home on prednisone 20 mg - 2 tablets twice daily for 5 days; also will need EpiPen injection to keep at home. Patient seen by ENT yesterday. Known patient with history of lung cancer; known to Dr Mason Brown in oncology; follow-up next week. Known patient with COPD; known to Dr Pepe Bell. On stiolto inhaler at home. CXR done  - s/p RT UL lobectomy; right sided ribs resection' no focal mass or pleural effusion; left UL peripheral scarring changes. Keep SPO2 >=92%. HOB 30 degree elevation all the time. Aspiration precautions. Incentive spirometry. CVS: Stable hemodynamics-monitor. Tele- sinus rhythm. ID: No active issues. Hematology/Oncology: Normal CBC. Renal: Normal renal function. GI: Normal LFT except for mildly decreased albumin. Endocrine: Monitor BS-stable. Neurology: normal mentation.   IVF:  ml/hr-okay to stop.  Nutrition: SLP eval-cleared for oral diet. Prophylaxis: DVT Prophylaxis: Enoxaparin. GI Prophylaxis: pepcid. Lines/Tubes: PIVs  Quality Care: PPI, DVT prophylaxis, HOB elevated, Infection control all reviewed and addressed. Care of plan d/w Dr Chandni Neal. Discussed with patient, daughter at bedside; updated medical management from ICU perspective; patient stable to be discharged home. Moderate complexity decision making was performed during the evaluation of this patient       Subjective/History of Present Illness:     Patient is a 81 y.o. male with PMHx significant for COPD, smoker, history of lung cancer. Status post right upper lobe resection in past.  Patient seeing Dr Lona Kulkarni for metastatic cancer disease. S/p EBUS 6/9/2022 - [facility] LN 7 and 4L - metastatic adenocarcinoma. Patient has CT chest with iv contrast yesterday. He came to ER today with worsening swelling in tongue. Patient given steroids and benadryl in ER.    2/17/2023  Patient seen in ICU. Clinically improved. No problems with speech or respirations. Stable hemodynamics. He is not on home O2. He is on Stiolto at home and sees Dr Lily Hayward in our office.       Review of Systems: Unchanged  HEENT: No epistaxis, no nasal drainage, no difficulty in swallowing  Respiratory: as above  Cardiovascular: no chest pain, no palpitations, no chronic leg edema  Gastrointestinal: no abd pain, no vomiting, no diarrhea  Genitourinary: No urinary symptoms or hematuria  Neurological: No focal weakness, no seizures, no headaches  Behvioral/Psych: No anxiety, no depression  Constitutional: No fever, no chills, no weight loss      Allergies   Allergen Reactions    Iv Contrast [Iodides] Angioedema     Possible cause of angioedema     Codeine Nausea And Vomiting      Past Medical History:   Diagnosis Date    Arthritis     osteosrthritis    Cancer ([facility] [address] 2019    Lung    Chronic kidney disease 2017    kidney stones multiple episodes    Kidney stones     Hx of    Sleep apnea     \"possible\"      Past Surgical History:   Procedure Laterality Date    CATARACT REMOVAL Bilateral 2012    CHEST SURGERY Right 2019    Lung Upper Lobectomy    ORTHOPEDIC SURGERY Left 1950    Foot partial amputation    TONSILLECTOMY      at 11 y/o    UROLOGICAL SURGERY  1960s    Cystoscopy, kidney stone removal      Social History     Tobacco Use    Smoking status: Some Days     Packs/day: 0.50     Types: Cigarettes    Smokeless tobacco: Former     Quit date: 6/9/1965    Tobacco comments:     Quit smoking: Trying to quit, instructed not to smoke 24 hours prior to [facility]   Substance Use Topics    Alcohol use: Not on file      No family history on file. Prior to Admission medications    Medication Sig Start Date End Date Taking?  Authorizing Provider   clobetasol (TEMOVATE) 0.05 % ointment APPLY TOPICALLY TWICE DAILY A THIN LAYER TO THE AFFECTED AREAS OF BACK AND POSTERIOR NECK 2/4/23   Historical Provider, MD   STIOLTO RESPIMAT 2.5-2.5 MCG/ACT AERS INHALE 2 PUFFS BY MOUTH AT THE SAME TIME EVERY DAY 1/6/23   Historical Provider, MD     Current Facility-Administered Medications   Medication Dose Route Frequency Provider Last Rate Last Admin    methylPREDNISolone sodium (SOLU-MEDROL) injection 40 mg  40 mg IntraVENous Q6H Shayy Olmedo MD   40 mg at 02/17/23 9670    diphenhydrAMINE (BENADRYL) injection 25 mg  25 mg IntraVENous Q12H Shayy Olmedo MD   25 mg at 02/17/23 0545    0.9 % sodium chloride infusion   IntraVENous Continuous Shayy Olmedo  mL/hr at 02/16/23 2336 New Bag at 02/16/23 2336    ipratropium-albuterol (DUONEB) nebulizer solution 1 ampule  1 ampule Inhalation Q6H WA Shayy Olmedo MD   1 ampule at 02/17/23 0808    famotidine (PEPCID) tablet 20 mg  20 mg Oral BID Shayy Olmedo MD   20 mg at 02/16/23 1731    sodium chloride flush 0.9 % injection 5-40 mL  5-40 mL IntraVENous 2 times per day Vi Mabry MD   10 mL at 02/16/23 2336    sodium chloride flush 0.9 % injection 5-40 mL  5-40 mL IntraVENous PRN Priscila Hazel MD        0.9 % sodium chloride infusion   IntraVENous PRN Priscila Hazel MD        enoxaparin (LOVENOX) injection 40 mg  40 mg SubCUTAneous Q24H Priscila Hazel MD   40 mg at 02/16/23 1733    ondansetron (ZOFRAN-ODT) disintegrating tablet 4 mg  4 mg Oral Q8H PRN Priscila Hazel MD        Or    ondansetron [facility]) injection 4 mg  4 mg IntraVENous Q6H PRN Priscila Hazel MD        polyethylene glycol (GLYCOLAX) packet 17 g  17 g Oral Daily PRN Priscila Hazel MD        acetaminophen (TYLENOL) tablet 650 mg  650 mg Oral Q6H PRN Priscila Hazel MD        Or    acetaminophen (TYLENOL) suppository 650 mg  650 mg Rectal Q6H PRN Priscila Hazel MD               Objective:    Intake/Output:     Intake/Output Summary (Last 24 hours) at 2/17/2023 0947  Last data filed at 2/17/2023 0946  Gross per 24 hour   Intake 905 ml   Output 1300 ml   Net -395 ml       Vital Signs:    /66   Pulse 63   Temp 97.7 °F (36.5 °C) (Oral)   Resp 21   Ht 5' 9\" (1.753 m)   Wt 124 lb 5.4 oz (56.4 kg)   SpO2 97%   BMI 18.36 kg/m²     Weight:  Wt Readings from Last 3 Encounters:   02/16/23 124 lb 5.4 oz (56.4 kg)   05/31/22 135 lb (61.2 kg)        Physical Exam:     Comfortable; on room air; acyanotic  HEENT: pupils not dilated, no scleral jaundice, moist oral mucosa, no drooling, tongue not swollen; mallampati 1 now  Neck: No adenopathy or thyroid swelling; no stridor  CVS: Normal heart sounds; sinus rhythm no murmur; JVD not seen   RS: Good air entry bilateral; lungs clear; no wheezing or crackles   Abd: Soft, nontender, nondistended   Neuro: non focal, awake, alert]  Extrm: no leg edema or swelling or clubbing  Skin: no rash  Lymphatic: no cervical or supraclavicular adenopathy  Psych: cooperative      Data:           Latest Reference Range & Units 2/16/23 23:12 2/17/23 03:39   Sodium 136 - 145 mmol/L 139 139   Potassium 3.5 - 5.5 mmol/L 4.3 4.4   Chloride 100 - 111 mmol/L 109 108   CO2 21 - 32 mmol/L 25 26   BUN,BUNPL 7.0 - 18 MG/DL 18 17   Creatinine 0.6 - 1.3 MG/DL 0.93 0.98   Bun/Cre Ratio 12 - 20   19 17   Anion Gap 3.0 - 18 mmol/L 5 5   Est, Glom Filt Rate >60 ml/min/1.73m2 >60 >60   Magnesium 1.6 - 2.6 mg/dL  2.2   Glucose, Random 74 - 99 mg/dL 175 (H) 139 (H)   CALCIUM, SERUM, [id number] 8.5 - 10.1 MG/DL 8.5 9.0   ALBUMIN/GLOBULIN RATIO 0.8 - 1.7    0.8   Total Protein 6.4 - 8.2 g/dL  6.8   Albumin 3.4 - 5.0 g/dL  3.0 (L)   Globulin 2.0 - 4.0 g/dL  3.8   Alk Phosphatase 45 - 117 U/L  107   ALT 16 - 61 U/L  14 (L)   AST 10 - 38 U/L  11   BILIRUBIN TOTAL 0.2 - 1.0 MG/DL  0.3   WBC 4.6 - 13.2 K/uL 7.5 11.1   RBC 4.35 - 5.65 M/uL 4.59 4.63   Hemoglobin Quant 13.0 - 16.0 g/dL 13.2 13.2   Hematocrit 36.0 - 48.0 % 40.2 40.9   MCV 78.0 - 100.0 FL 87.6 88.3   MCH 24.0 - 34.0 PG 28.8 28.5   MCHC 31.0 - 37.0 g/dL 32.8 32.3   MPV 9.2 - 11.8 FL 9.3 8.6 (L)   RDW 11.6 - 14.5 % 14.8 (H) 14.7 (H)   Platelet Count 630 - 420 K/uL 199 185   Absolute Mono # 0.05 - 1.2 K/UL 0.1 0.3   Eosinophils % 0 - 5 % 0 0   Basophils Absolute 0.0 - 0.1 K/UL 0.0 0.0   Differential Type -   AUTOMATED AUTOMATED   Seg Neutrophils 40 - 73 % 93 (H) 91 (H)   Segs Absolute 1.8 - 8.0 K/UL 7.0 10.2 (H)   Lymphocytes 21 - 52 % 5 (L) 5 (L)   Absolute Lymph # 0.9 - 3.6 K/UL 0.4 (L) 0.6 (L)   Monocytes 3 - 10 % 2 (L) 3   Absolute Eos # 0.0 - 0.4 K/UL 0.0 0.0   Basophils 0 - 2 % 0 0   Immature Granulocytes 0.0 - 0.5 % 0 1 (H)   Nucleated Red Blood Cells 0  WBC  0.00 - 0.01 K/uL 0.0  0.00 0.0  0.00   Absolute Immature Granulocyte 0.00 - 0.04 K/UL 0.0 0.1 (H)   (H): Data is abnormally high  (L): Data is abnormally low      Images report reviewed by me:    CXR reviewed by me:  XR 2/16 (Most Recent).   Narrative   INDICATION:  angioedema        COMPARISON: June 2022       FINDINGS: Single AP portable view of the chest obtained at 1310 demonstrates a   stable cardiomediastinal silhouette. There is no focal airspace disease. There   are chronic postsurgical changes involving the right ribs. There is left upper   lobe linear scarring versus atelectasis, as well as volume loss in the right   lung. Emphysematous changes are noted. No osseous abnormalities are seen. Impression   No acute process or significant change. Please note: Voice-recognition software may have been used to generate this report, which may have resulted in some phonetic-based errors in grammar and contents. Even though attempts were made to correct all the mistakes, some may have been missed, and remained in the body of the document.       Tan Jon MD  2/17/2023

## 2023-02-17 NOTE — PLAN OF CARE
Problem: Skin/Tissue Integrity  Goal: Absence of new skin breakdown  Description: 1. Monitor for areas of redness and/or skin breakdown  2. Assess vascular access sites hourly  3. Every 4-6 hours minimum:  Change oxygen saturation probe site  4. Every 4-6 hours:  If on nasal continuous positive airway pressure, respiratory therapy assess nares and determine need for appliance change or resting period.   Outcome: Progressing         Problem: Discharge Planning  Goal: Discharge to home or other facility with appropriate resources  Recent Flowsheet Documentation  Taken 2/17/2023 0000 by Tamara Castelan RN  Discharge to home or other facility with appropriate resources:   Identify barriers to discharge with patient and caregiver   Arrange for needed discharge resources and transportation as appropriate

## 2023-02-17 NOTE — CONSULTS
Hematology / Oncology Initial Consult Note    Admit Date: 2/16/2023    Reason for Consult: Lung Cancer    Requesting Physician: Dr. Federica Pa:     Yoon Thomas is a 81 y.o., White (non-), male, who I have been asked to see for lung cancer/angioedema. Principal Problem (Resolved):    Encephalopathy  Active Problems:    Angioedema of tongue    Adenocarcinoma of lung (HCC)    Tobacco dependence syndrome    Weight loss    Lymphadenopathy    Unclear cause of tongue angioedema. This is his second episode in three weeks, each resolved with benadryl/steroids. Now much improved. Durvalumab has been on hold. Denies any other medications and not on ACEi. Will order C1 esterase inhibitor. Plan:   - Much improved today, tongue back to baseline  - C1 esterase inhibitor to determine if hereditary/acquired deficiency  - Scheduled to see Dr. Pina Kirby on Monday 2/20/23 to discuss imaging/next steps    Yazan Fine MD  [address]        History of Present Illness: Yoon Thomas is a 81 y.o. male with Lung Adenocarcinoma with local recurrence on treatment with Durvalumab under the care of Dr. Pina Kirby at [facility], [address] [place] [address], who presented to the ED with a swollen tongue. He did CT scans the day before. He was given steroids and benadryl in the ER. ENT was consulted. CXR with no acute process. This morning, he notes doing well and has no complaints. He states this is the second episode of face swelling he has had. The initial one occurred three weeks ago where his entire R side of the face and lips were swollen. He received benadryl and steroids and this resolved within 24 hrs. This occurrence happened spontaneously and just in the tongue. He denies any prior episodes. Denies any shortness of breath or difficulty swallowing. He is eating lemon drops with no problem.      ROS:    Constitutional: No fever, chills, diaphoresis, activity change, appetite change, fatigue or unexpected weight change. HEENT: No sore throat, rhinorrhea,or visual disturbance. Respiratory: No cough, chest tightness, shortness of breath. Cardiovascular:No chest pain, palpitations. Gastrointestinal:No nausea, vomiting, diarrhea, constipation, early satiety, abdominal distention, abdominal pain. Genitourinary: No dysuria, urgency, frequency, hematuria, flank pain, difficulty urinating. Neurological: No headache, dizziness, weakness, tingling and numbness or fall. Musculoskeletal: No bone pain or back pain. No arthralgia or myalgia. No bruise/bleed easily. No extremity swelling. Past Medical History:   Diagnosis Date    Arthritis     osteosrthritis    Cancer ([facility] [address] 2019    Lung    Chronic kidney disease 2017    kidney stones multiple episodes    Kidney stones     Hx of    Sleep apnea     \"possible\"       Past Surgical History:   Procedure Laterality Date    CATARACT REMOVAL Bilateral 2012    CHEST SURGERY Right 2019    Lung Upper Lobectomy    ORTHOPEDIC SURGERY Left 1950    Foot partial amputation    TONSILLECTOMY      at 11 y/o    UROLOGICAL SURGERY  1960s    Cystoscopy, kidney stone removal       No family history on file.     Social History     Socioeconomic History    Marital status:      Spouse name: None    Number of children: None    Years of education: None    Highest education level: None   Tobacco Use    Smoking status: Some Days     Packs/day: 0.50     Types: Cigarettes    Smokeless tobacco: Former     Quit date: 6/9/1965    Tobacco comments:     Quit smoking: Trying to quit, instructed not to smoke 24 hours prior to [employer]   Substance and Sexual Activity    Drug use: Never       Current Facility-Administered Medications   Medication Dose Route Frequency    methylPREDNISolone sodium (SOLU-MEDROL) injection 40 mg  40 mg IntraVENous Q6H    diphenhydrAMINE (BENADRYL) injection 25 mg  25 mg IntraVENous Q12H    0.9 % sodium chloride infusion   IntraVENous Continuous ipratropium-albuterol (DUONEB) nebulizer solution 1 ampule  1 ampule Inhalation Q6H WA    famotidine (PEPCID) tablet 20 mg  20 mg Oral BID    sodium chloride flush 0.9 % injection 5-40 mL  5-40 mL IntraVENous 2 times per day    sodium chloride flush 0.9 % injection 5-40 mL  5-40 mL IntraVENous PRN    0.9 % sodium chloride infusion   IntraVENous PRN    enoxaparin (LOVENOX) injection 40 mg  40 mg SubCUTAneous Q24H    ondansetron (ZOFRAN-ODT) disintegrating tablet 4 mg  4 mg Oral Q8H PRN    Or    ondansetron (ZOFRAN) injection 4 mg  4 mg IntraVENous Q6H PRN    polyethylene glycol (GLYCOLAX) packet 17 g  17 g Oral Daily PRN    acetaminophen (TYLENOL) tablet 650 mg  650 mg Oral Q6H PRN    Or    acetaminophen (TYLENOL) suppository 650 mg  650 mg Rectal Q6H PRN       Prior to Admission medications    Medication Sig Start Date End Date Taking?  Authorizing Provider   clobetasol (TEMOVATE) 0.05 % ointment APPLY TOPICALLY TWICE DAILY A THIN LAYER TO THE AFFECTED AREAS OF BACK AND POSTERIOR NECK 23   Historical Provider, MD   Telma Solum 2.5-2.5 MCG/ACT AERS INHALE 2 PUFFS BY MOUTH AT THE SAME TIME EVERY DAY 23   Historical Provider, MD       Allergies   Allergen Reactions    Iv Contrast [Iodides] Angioedema     Possible cause of angioedema     Codeine Nausea And Vomiting       Physical Exam:    Temp (24hrs), Av.2 °F (36.8 °C), Min:97.6 °F (36.4 °C), Max:98.6 °F (37 °C)  [email or web address]      General: Alert , Oriented, in no distress  HEENT: no pallor, anicteric sclera, oral pharynx without lesion   No cervical, supraclavicular, axillary and inguinal lymphadenopathy palpated  Heart: regular rate, and rhythm, without murmur, gallop or rubbing  Lungs:breathing comfortably on room air, clear to auscultation and percussion bilaterally  ABD: bowel sound present, soft, nondistended, nontender, no hepatosplenomegaly or mass  Extremities: warm, well perfused, no edema  MSK: no tenderness along the spine or long bones  Skin: No rash  Neuro: non-focal    Imaging:  CXR:  No acute process or significant change.

## 2023-02-20 LAB — C1INH SERPL-MCNC: 27 MG/DL (ref 21–39)

## 2023-02-23 NOTE — PROGRESS NOTES
Physician Progress Note      PATIENT:               Wagner Goins  CSN #:                  [id number]  :                       1940  ADMIT DATE:       2023 6:06 AM  DISCH DATE:        2023 10:42 AM  RESPONDING  PROVIDER #:        Lion Worthington MD        QUERY TEXT:    Type of Encephalopathy: Please provide further specificity, if known. Clinical indicators include: encephalopathy, fever, chronic kidney disease  Options provided:  -- Anoxic/hypoxic encephalopathy  -- Metabolic encephalopathy  -- Toxic encephalopathy  -- Hepatic encephalopathy  -- Hypertensive encephalopathy  -- Other - I will add my own diagnosis  -- Disagree - Not applicable / Not valid  -- Disagree - Clinically Unable to determine / Unknown        PROVIDER RESPONSE TEXT:    The patient has anoxic/hypoxic encephalopathy.       Electronically signed by:  Lion Worthington MD 2023 7:56 AM

## (undated) DEVICE — BITE BLOCK ENDOSCP UNIV AD 6 TO 9.4 MM

## (undated) DEVICE — ENDOBRONCHIAL ULTRASOUND TRANSBRONCHIAL ASPIRATION NEEDLE: Brand: EXPECT PULMONARY

## (undated) DEVICE — BE 105-8 BRONCHOSCOPE SWIVEL - 15MM ID/22MM OD (PATIENT PORT) X15MM OD (EQUIPMENT PORT). REUSABLE.  FITS COMPONENTS OF ADULT VENTILATOR CIRCUITS.  MOLDED OF POLYETHERIMIDE. INCLUDES TWO SILICONE RUBBER CAPS; ONE CAP ALLOWS FOR THE USE OF A SUCTIONING CATHETER WHILE THE OTHER CAP ALLOWS FOR THE USE OF A FIBER-OPTIC BRONCHOSCOPE WITHOUT SIGNIFICANT LOSS OF PEEP.: Brand: BE 105-8 BRONCHOSCOPE SWIVEL

## (undated) DEVICE — AIRLIFE™ ADULT OXYGEN MASK VINYL, UNDER-THE-CHIN STYLE, MEDIUM CONCENTRATION MASK WITH 7 FEET (2.1 M) CRUSH-RESISTANT OXYGEN TUBING: Brand: AIRLIFE™

## (undated) DEVICE — SYR 10ML SLIP TIP 1/5ML GRAD --

## (undated) DEVICE — GARMENT,MEDLINE,DVT,INT,CALF,MED, GEN2: Brand: MEDLINE

## (undated) DEVICE — SLEEVE COMPR STD 12 IN FOR 165IN CALF COMFORT VENODYNE SYS

## (undated) DEVICE — SET ADMIN L104IN 20 GTT GRAV RLER CLMP SMRT SITE NDL FREE

## (undated) DEVICE — TUBING, SUCTION, 1/4" X 12', STRAIGHT: Brand: MEDLINE

## (undated) DEVICE — GLOVE ORANGE PI 7 1/2   MSG9075

## (undated) DEVICE — BLADE, TONGUE, 6", STERILE: Brand: MEDLINE

## (undated) DEVICE — 1860S HEALTH CARE RESPIRATOR N95 120EA/C: Brand: 3M™

## (undated) DEVICE — CONTAINER PREFIL FRMLN 40ML --

## (undated) DEVICE — SINGLE USE BIOPSY VALVE MAJ-210: Brand: SINGLE USE BIOPSY VALVE (STERILE)

## (undated) DEVICE — GOWN ISOL IMPERV UNIV, DISP, OPEN BACK, BLUE --

## (undated) DEVICE — WANG TRANSBRONCHIAL HISTOLOGY NEEDLE FOR CENTRAL REGION, 1.9 MM X 130 CM: Brand: WANG

## (undated) DEVICE — AIRLIFE™ NASAL OXYGEN CANNULA CURVED, FLARED TIP WITH 14 FOOT (4.3 M) CRUSH-RESISTANT TUBING, OVER-THE-EAR STYLE: Brand: AIRLIFE™

## (undated) DEVICE — NDL BX EXCELON 21GX130CM --

## (undated) DEVICE — MASK SURG REG ORNG LEV 3 SFTY SEAL 4 LAYR SFT INNR LINING

## (undated) DEVICE — REAG CYTO FIX 4OZ PMP SPRY --

## (undated) DEVICE — YANKAUER,SMOOTH HANDLE,HIGH CAPACITY: Brand: MEDLINE INDUSTRIES, INC.

## (undated) DEVICE — SINGLE USE SUCTION VALVE MAJ-209: Brand: SINGLE USE SUCTION VALVE (STERILE)

## (undated) DEVICE — BASIN EMESIS 500CC ROSE 250/CS 60/PLT: Brand: MEDEGEN MEDICAL PRODUCTS, LLC

## (undated) DEVICE — TOWEL,OR,DSP,ST,BLUE,STD,4/PK,20PK/CS: Brand: MEDLINE

## (undated) DEVICE — 1860 HEALTH CARE N95 MASK, 20EACH/BOX  6 BX/C: Brand: 3M™

## (undated) DEVICE — NEBULIZER,KIT,T-MOUTHPIECE,6"RESER,7'TUB: Brand: MEDLINE

## (undated) DEVICE — SLIDE MICRO PLAIN PRECLEAND --

## (undated) DEVICE — LINER SUCT CANSTR 3000CC PLAS SFT PRE ASSEMB W/OUT TBNG W/

## (undated) DEVICE — BRUSH CYTO BRONCHSCP 1.5/140MM -- CELLEBRITY

## (undated) DEVICE — APPLICATOR FBR TIP L6IN COT TIP WOOD SHFT SWAB 2000 PER CA

## (undated) DEVICE — FCPS BIOP PULM RAD JAW 100CML -- BX/10 M00515180

## (undated) DEVICE — CATHETER SUCT TR FL TIP 14FR W/ O CTRL

## (undated) DEVICE — MAILER SLDE MICSCP 2 PLC --